# Patient Record
Sex: MALE | URBAN - METROPOLITAN AREA
[De-identification: names, ages, dates, MRNs, and addresses within clinical notes are randomized per-mention and may not be internally consistent; named-entity substitution may affect disease eponyms.]

---

## 2017-12-07 PROBLEM — Z00.00 ENCOUNTER FOR PREVENTIVE HEALTH EXAMINATION: Status: ACTIVE | Noted: 2017-12-07

## 2024-08-14 ENCOUNTER — EMERGENCY (EMERGENCY)
Facility: HOSPITAL | Age: 84
LOS: 0 days | Discharge: HOME | End: 2024-08-14
Attending: EMERGENCY MEDICINE
Payer: MEDICARE

## 2024-08-14 VITALS
TEMPERATURE: 97 F | OXYGEN SATURATION: 97 % | WEIGHT: 160.06 LBS | RESPIRATION RATE: 19 BRPM | HEART RATE: 63 BPM | SYSTOLIC BLOOD PRESSURE: 135 MMHG | DIASTOLIC BLOOD PRESSURE: 77 MMHG | HEIGHT: 68 IN

## 2024-08-14 VITALS — TEMPERATURE: 97 F | RESPIRATION RATE: 18 BRPM | OXYGEN SATURATION: 99 %

## 2024-08-14 DIAGNOSIS — R00.0 TACHYCARDIA, UNSPECIFIED: ICD-10-CM

## 2024-08-14 DIAGNOSIS — E83.51 HYPOCALCEMIA: ICD-10-CM

## 2024-08-14 DIAGNOSIS — F32.A DEPRESSION, UNSPECIFIED: ICD-10-CM

## 2024-08-14 DIAGNOSIS — E86.0 DEHYDRATION: ICD-10-CM

## 2024-08-14 DIAGNOSIS — R63.4 ABNORMAL WEIGHT LOSS: ICD-10-CM

## 2024-08-14 LAB
ALBUMIN SERPL ELPH-MCNC: 3 G/DL — LOW (ref 3.3–5)
ALP SERPL-CCNC: 68 U/L — SIGNIFICANT CHANGE UP (ref 40–120)
ALT FLD-CCNC: 42 U/L — SIGNIFICANT CHANGE UP (ref 12–78)
ANION GAP SERPL CALC-SCNC: 4 MMOL/L — LOW (ref 5–17)
ANISOCYTOSIS BLD QL: SLIGHT — SIGNIFICANT CHANGE UP
APTT BLD: 24 SEC — LOW (ref 24.5–35.6)
AST SERPL-CCNC: 50 U/L — HIGH (ref 15–37)
BASOPHILS # BLD AUTO: 0 K/UL — SIGNIFICANT CHANGE UP (ref 0–0.2)
BASOPHILS NFR BLD AUTO: 0 % — SIGNIFICANT CHANGE UP (ref 0–2)
BILIRUB SERPL-MCNC: 0.5 MG/DL — SIGNIFICANT CHANGE UP (ref 0.2–1.2)
BUN SERPL-MCNC: 25 MG/DL — HIGH (ref 7–23)
BURR CELLS BLD QL SMEAR: PRESENT — SIGNIFICANT CHANGE UP
CALCIUM SERPL-MCNC: 8 MG/DL — LOW (ref 8.5–10.1)
CHLORIDE SERPL-SCNC: 108 MMOL/L — SIGNIFICANT CHANGE UP (ref 96–108)
CO2 SERPL-SCNC: 26 MMOL/L — SIGNIFICANT CHANGE UP (ref 22–31)
CREAT SERPL-MCNC: 1.18 MG/DL — SIGNIFICANT CHANGE UP (ref 0.5–1.3)
EGFR: 61 ML/MIN/1.73M2 — SIGNIFICANT CHANGE UP
ELLIPTOCYTES BLD QL SMEAR: SLIGHT — SIGNIFICANT CHANGE UP
EOSINOPHIL # BLD AUTO: 0 K/UL — SIGNIFICANT CHANGE UP (ref 0–0.5)
EOSINOPHIL NFR BLD AUTO: 0 % — SIGNIFICANT CHANGE UP (ref 0–6)
GLUCOSE SERPL-MCNC: 100 MG/DL — HIGH (ref 70–99)
HCT VFR BLD CALC: 45.1 % — SIGNIFICANT CHANGE UP (ref 39–50)
HGB BLD-MCNC: 14.4 G/DL — SIGNIFICANT CHANGE UP (ref 13–17)
INR BLD: 0.94 RATIO — SIGNIFICANT CHANGE UP (ref 0.85–1.18)
LYMPHOCYTES # BLD AUTO: 2.89 K/UL — SIGNIFICANT CHANGE UP (ref 1–3.3)
LYMPHOCYTES # BLD AUTO: 63 % — HIGH (ref 13–44)
MAGNESIUM SERPL-MCNC: 2.2 MG/DL — SIGNIFICANT CHANGE UP (ref 1.6–2.6)
MANUAL SMEAR VERIFICATION: SIGNIFICANT CHANGE UP
MCHC RBC-ENTMCNC: 23.6 PG — LOW (ref 27–34)
MCHC RBC-ENTMCNC: 31.9 G/DL — LOW (ref 32–36)
MCV RBC AUTO: 73.8 FL — LOW (ref 80–100)
MONOCYTES # BLD AUTO: 0.6 K/UL — SIGNIFICANT CHANGE UP (ref 0–0.9)
MONOCYTES NFR BLD AUTO: 13 % — SIGNIFICANT CHANGE UP (ref 2–14)
NEUTROPHILS # BLD AUTO: 0.69 K/UL — LOW (ref 1.8–7.4)
NEUTROPHILS NFR BLD AUTO: 15 % — LOW (ref 43–77)
NRBC # BLD: 0 /100 WBCS — SIGNIFICANT CHANGE UP (ref 0–0)
NRBC # BLD: SIGNIFICANT CHANGE UP /100 WBCS (ref 0–0)
OVALOCYTES BLD QL SMEAR: SLIGHT — SIGNIFICANT CHANGE UP
PHOSPHATE SERPL-MCNC: 2.7 MG/DL — SIGNIFICANT CHANGE UP (ref 2.5–4.5)
PLAT MORPH BLD: NORMAL — SIGNIFICANT CHANGE UP
PLATELET # BLD AUTO: 116 K/UL — LOW (ref 150–400)
POTASSIUM SERPL-MCNC: 5.2 MMOL/L — SIGNIFICANT CHANGE UP (ref 3.5–5.3)
POTASSIUM SERPL-SCNC: 5.2 MMOL/L — SIGNIFICANT CHANGE UP (ref 3.5–5.3)
PROT SERPL-MCNC: 7 GM/DL — SIGNIFICANT CHANGE UP (ref 6–8.3)
PROTHROM AB SERPL-ACNC: 11.2 SEC — SIGNIFICANT CHANGE UP (ref 9.5–13)
RBC # BLD: 6.11 M/UL — HIGH (ref 4.2–5.8)
RBC # FLD: 15.9 % — HIGH (ref 10.3–14.5)
RBC BLD AUTO: ABNORMAL
SODIUM SERPL-SCNC: 138 MMOL/L — SIGNIFICANT CHANGE UP (ref 135–145)
TROPONIN I, HIGH SENSITIVITY RESULT: 51.9 NG/L — SIGNIFICANT CHANGE UP
VARIANT LYMPHS # BLD: 9 % — HIGH (ref 0–6)
WBC # BLD: 4.59 K/UL — SIGNIFICANT CHANGE UP (ref 3.8–10.5)
WBC # FLD AUTO: 4.59 K/UL — SIGNIFICANT CHANGE UP (ref 3.8–10.5)

## 2024-08-14 PROCEDURE — 99285 EMERGENCY DEPT VISIT HI MDM: CPT

## 2024-08-14 PROCEDURE — 71045 X-RAY EXAM CHEST 1 VIEW: CPT | Mod: 26

## 2024-08-14 PROCEDURE — 93010 ELECTROCARDIOGRAM REPORT: CPT

## 2024-08-14 RX ORDER — BACTERIOSTATIC SODIUM CHLORIDE 0.9 %
1000 VIAL (ML) INJECTION ONCE
Refills: 0 | Status: COMPLETED | OUTPATIENT
Start: 2024-08-14 | End: 2024-08-14

## 2024-08-14 RX ADMIN — Medication 1000 MILLILITER(S): at 12:14

## 2024-08-14 RX ADMIN — Medication 1000 MILLILITER(S): at 11:14

## 2024-08-14 RX ADMIN — Medication 500 MILLILITER(S): at 11:14

## 2024-08-14 NOTE — ED ADULT NURSE NOTE - NSFALLRISKINTERV_ED_ALL_ED

## 2024-08-14 NOTE — ED PROVIDER NOTE - CLINICAL SUMMARY MEDICAL DECISION MAKING FREE TEXT BOX
Discussed with patient as well as son regarding lab results.  Patient otherwise without major signs of dehydration noted mild  hypocalcemia noted otherwise noted orthostatics negative and patient with improvement after IV fluids.  Patient was offered admission however states she would like to go home possible for further hydration and intake.  Likely mild dehydration otherwise patient agreeable to eating and drinking when he gets home.  Discussed with son that they may return should there be a change again or worsening. Xray chest without acute findings

## 2024-08-14 NOTE — ED ADULT NURSE NOTE - NSFALLRISKFACTORS_ED_ALL_ED
Patient called and stated a PA is required for Modafinil 200 mg.    Patient is currently at [facility]- [phone], to contact her.    Patient wanted to know if this will be done today.   No indicators present

## 2024-08-14 NOTE — ED PROVIDER NOTE - NSFOLLOWUPINSTRUCTIONS_ED_ALL_ED_FT
Dehydration, Adult  Dehydration is a condition in which there is not enough water or other fluids in the body. This happens when a person loses more fluids than they take in. Important organs, such as the kidneys, brain, and heart, cannot function without a proper amount of fluids. Any loss of fluids from the body can lead to dehydration.    Dehydration can be mild, moderate, or severe. It should be treated right away to prevent it from becoming severe.    What are the causes?  Dehydration may be caused by:  Health conditions, such as diarrhea, vomiting, fever, infection, or sweating or urinating a lot.  Not drinking enough fluids.  Certain medicines, such as medicines that remove excess fluid from the body (diuretics).  Lack of safe drinking water.  Not being able to get enough water and food.  What increases the risk?  The following factors may make you more likely to develop this condition:  Having a long-term (chronic) illness that has not been treated properly, such as diabetes, heart disease, or kidney disease.  Being 65 years of age or older.  Having a disability.  Living in a place that is high in altitude, where thinner, drier air causes more fluid loss.  Doing exercises that put stress on your body for a long time (endurance sports).  Being active in a hot climate.  What are the signs or symptoms?  Symptoms of dehydration depend on how severe it is.    Mild or moderate dehydration    Thirst.  Dry lips or dry mouth.  Dizziness or light-headedness.  Muscle cramps.  Dark urine. Urine may be the color of tea.  Less urine or tears produced than usual.  Headache.  Severe dehydration    Changes in skin. Your skin may be cold and clammy, blotchy, or pale. Your skin also may not return to normal after being lightly pinched and released.  Little or no tears, urine, or sweat.  Rapid breathing and low blood pressure. Your pulse may be weak or may be faster than 100 beats per minute when you are sitting still.  Other changes, such as:  Feeling very thirsty.  Sunken eyes.  Cold hands and feet.  Confusion.  Being very tired (lethargic) or having trouble waking from sleep.  Short-term weight loss.  Loss of consciousness.  How is this diagnosed?  This condition is diagnosed based on your symptoms and a physical exam. You may have blood and urine tests to help confirm the diagnosis.    How is this treated?  A person's hand, showing an inserted IV catheter.   Treatment for this condition depends on how severe it is. Treatment should be started right away. Do not wait until dehydration becomes severe. Severe dehydration is an emergency and needs to be treated in a hospital.  Mild or moderate dehydration can be treated at home. You may be asked to:  Drink more fluids.  Drink an oral rehydration solution (ORS). This drink restores fluids, salts, and minerals in the blood (electrolytes).  Stop any activities that caused dehydration, such as exercise.  Cool off with cool compresses, cool mist, or cool fluids, if heat or too much sweat caused your condition.  Take medicine to treat fever, if fever caused your condition.  Take medicine to treat nausea and diarrhea, if vomiting or diarrhea caused your condition.  Severe dehydration can be treated:  With IV fluids.  By correcting abnormal levels of electrolytes in your body.  By treating the underlying cause of dehydration.  Follow these instructions at home:  Oral rehydration solution    A glass of water with a spoonful of ORS ready to be added to it.  If told by your health care provider, drink an ORS:  Make an ORS by following instructions on the package.  Start by drinking small amounts, about ½ cup (120 mL) every 5–10 minutes.  Slowly increase how much you drink until you have taken the amount recommended by your health care provider.  Eating and drinking    A person drinking water from a glass.   Drink enough clear fluid to keep your urine pale yellow. If you were told to drink an ORS, finish the ORS first and then start slowly drinking other clear fluids. Drink fluids such as:  Water. Do not drink only water. Doing that can lead to hyponatremia, which is having too little salt (sodium) in the body.  Water from ice chips you suck on.  Diluted fruit juice. This is fruit juice that you have added water to.  Low-calorie sports drinks.  Eat foods that contain a healthy balance of electrolytes, such as bananas, oranges, potatoes, tomatoes, and spinach.  Do not drink alcohol.  Avoid the following:  Drinks that contain a lot of sugar. These include high-calorie sports drinks, fruit juice that is not diluted, and soda.  Caffeine.  Foods that are greasy or contain a lot of fat or sugar.  General instructions    Take over-the-counter and prescription medicines only as told by your health care provider.  Do not take sodium tablets. Doing that can lead to having too much sodium in the body (hypernatremia).  Return to your normal activities as told by your health care provider. Ask your health care provider what activities are safe for you.  Keep all follow-up visits. Your health care provider may need to check your progress and suggest new ways to treat your condition.  Contact a health care provider if:  You have muscle cramps, pain, or discomfort, such as:  Pain in your abdomen and the pain gets worse or stays in one area.  Stiff neck.  You have a rash.  You are more irritable than usual.  You are sleepier or have a harder time waking.  You feel weak or dizzy.  You feel very thirsty.  Get help right away if:  You have symptoms of severe dehydration.  You vomit every time you eat or drink.  Your vomiting gets worse, does not go away, or includes blood or green matter (bile).  You are getting treatment but symptoms are getting worse.  You have a fever.  You have a severe headache.  You have:  Diarrhea that gets worse or does not go away.  Blood in your stool. This may cause stool to look black and tarry.  Not urinating, or urinating only a small amount of very dark urine, within 6–8 hours.  You have trouble breathing.  These symptoms may be an emergency. Get help right away.  Do not wait to see if the symptoms will go away.  Do not drive yourself to the hospital. Call 911.  This information is not intended to replace advice given to you by your health care provider. Make sure you discuss any questions you have with your health care provider.

## 2024-08-14 NOTE — ED PROVIDER NOTE - OBJECTIVE STATEMENT
83 year old with no pmh presenting to ED due to decreased PO intake for past 3 days - otherwise son states he has not wanted to eat and has not had much to drink either. No pain or discomfort - noted by EMS to have tachycardia, presumed

## 2024-08-14 NOTE — ED ADULT NURSE NOTE - OBJECTIVE STATEMENT
83 year old male a/ox3 c/o weakness and decreased appetite x 3 days, pt denies any c/o pain at this time, denies sob, dizziness, lightheadedness, pt reports at baseline ambulates independently, pt denies falls/trauma, pt states "when I stand, I can't stand for long" pt also denies n/v, reports no past medical history

## 2024-08-14 NOTE — ED PROVIDER NOTE - PATIENT PORTAL LINK FT
You can access the FollowMyHealth Patient Portal offered by [facility] by registering at the following website: [email or web address]. By joining [employer]’s FollowMyHealth portal, you will also be able to view your health information using other applications (apps) compatible with our system.

## 2024-08-14 NOTE — ED ADULT TRIAGE NOTE - CHIEF COMPLAINT QUOTE
Patient BIBA presents to ED c/o weakness and poor appetite x 3 days. Patient also suffered 2 deaths within the family within the last month. Denies any CP or SOB.  no pmh

## 2024-08-14 NOTE — ED PROVIDER NOTE - CARDIAC RHYTHM
Flynn Morales Phone #  [phone]  [address] [place] [address]  Phone: [phone] ext. [phone]  Fax: [phone]  Monday - Thursday 9am-5pm  Friday 9am-4pm   regular

## 2025-01-01 ENCOUNTER — INPATIENT (INPATIENT)
Facility: HOSPITAL | Age: 85
LOS: 3 days | End: 2025-02-27
Attending: INTERNAL MEDICINE | Admitting: INTERNAL MEDICINE
Payer: MEDICARE

## 2025-01-01 ENCOUNTER — RESULT REVIEW (OUTPATIENT)
Age: 85
End: 2025-01-01

## 2025-01-01 VITALS — HEART RATE: 88 BPM | SYSTOLIC BLOOD PRESSURE: 83 MMHG | DIASTOLIC BLOOD PRESSURE: 56 MMHG

## 2025-01-01 DIAGNOSIS — I46.9 CARDIAC ARREST, CAUSE UNSPECIFIED: ICD-10-CM

## 2025-01-01 LAB
-  CLINDAMYCIN: SIGNIFICANT CHANGE UP
-  ERYTHROMYCIN: SIGNIFICANT CHANGE UP
-  GENTAMICIN: SIGNIFICANT CHANGE UP
-  OXACILLIN: SIGNIFICANT CHANGE UP
-  RIFAMPIN: SIGNIFICANT CHANGE UP
-  TETRACYCLINE: SIGNIFICANT CHANGE UP
-  TRIMETHOPRIM/SULFAMETHOXAZOLE: SIGNIFICANT CHANGE UP
-  VANCOMYCIN: SIGNIFICANT CHANGE UP
A1C WITH ESTIMATED AVERAGE GLUCOSE RESULT: 6.1 % — HIGH (ref 4–5.6)
ADD ON TEST-SPECIMEN IN LAB: SIGNIFICANT CHANGE UP
ALBUMIN SERPL ELPH-MCNC: 2.5 G/DL — LOW (ref 3.3–5)
ALBUMIN SERPL ELPH-MCNC: 2.6 G/DL — LOW (ref 3.3–5)
ALBUMIN SERPL ELPH-MCNC: 2.7 G/DL — LOW (ref 3.3–5)
ALBUMIN SERPL ELPH-MCNC: 2.8 G/DL — LOW (ref 3.3–5)
ALBUMIN SERPL ELPH-MCNC: 2.9 G/DL — LOW (ref 3.3–5)
ALBUMIN SERPL ELPH-MCNC: 3 G/DL — LOW (ref 3.3–5)
ALBUMIN SERPL ELPH-MCNC: 3 G/DL — LOW (ref 3.3–5)
ALBUMIN SERPL ELPH-MCNC: 3.2 G/DL — LOW (ref 3.3–5)
ALBUMIN SERPL ELPH-MCNC: 3.3 G/DL — SIGNIFICANT CHANGE UP (ref 3.3–5)
ALBUMIN SERPL ELPH-MCNC: 3.4 G/DL — SIGNIFICANT CHANGE UP (ref 3.3–5)
ALBUMIN SERPL ELPH-MCNC: 3.5 G/DL — SIGNIFICANT CHANGE UP (ref 3.3–5)
ALBUMIN SERPL ELPH-MCNC: 3.6 G/DL — SIGNIFICANT CHANGE UP (ref 3.3–5)
ALBUMIN SERPL ELPH-MCNC: 3.7 G/DL — SIGNIFICANT CHANGE UP (ref 3.3–5)
ALP SERPL-CCNC: 102 U/L — SIGNIFICANT CHANGE UP (ref 40–120)
ALP SERPL-CCNC: 104 U/L — SIGNIFICANT CHANGE UP (ref 40–120)
ALP SERPL-CCNC: 105 U/L — SIGNIFICANT CHANGE UP (ref 40–120)
ALP SERPL-CCNC: 110 U/L — SIGNIFICANT CHANGE UP (ref 40–120)
ALP SERPL-CCNC: 114 U/L — SIGNIFICANT CHANGE UP (ref 40–120)
ALP SERPL-CCNC: 117 U/L — SIGNIFICANT CHANGE UP (ref 40–120)
ALP SERPL-CCNC: 128 U/L — HIGH (ref 40–120)
ALP SERPL-CCNC: 129 U/L — HIGH (ref 40–120)
ALP SERPL-CCNC: 85 U/L — SIGNIFICANT CHANGE UP (ref 40–120)
ALP SERPL-CCNC: 87 U/L — SIGNIFICANT CHANGE UP (ref 40–120)
ALP SERPL-CCNC: 87 U/L — SIGNIFICANT CHANGE UP (ref 40–120)
ALP SERPL-CCNC: 91 U/L — SIGNIFICANT CHANGE UP (ref 40–120)
ALP SERPL-CCNC: 91 U/L — SIGNIFICANT CHANGE UP (ref 40–120)
ALP SERPL-CCNC: 94 U/L — SIGNIFICANT CHANGE UP (ref 40–120)
ALP SERPL-CCNC: 95 U/L — SIGNIFICANT CHANGE UP (ref 40–120)
ALT FLD-CCNC: 1174 U/L — HIGH (ref 4–41)
ALT FLD-CCNC: 1980 U/L — HIGH (ref 4–41)
ALT FLD-CCNC: 2005 U/L — HIGH (ref 4–41)
ALT FLD-CCNC: 2119 U/L — HIGH (ref 4–41)
ALT FLD-CCNC: 2164 U/L — HIGH (ref 4–41)
ALT FLD-CCNC: 3027 U/L — HIGH (ref 4–41)
ALT FLD-CCNC: 3447 U/L — HIGH (ref 4–41)
ALT FLD-CCNC: 42 U/L — HIGH (ref 4–41)
ALT FLD-CCNC: 453 U/L — HIGH (ref 4–41)
ALT FLD-CCNC: 54 U/L — HIGH (ref 4–41)
ALT FLD-CCNC: 59 U/L — HIGH (ref 4–41)
ALT FLD-CCNC: 61 U/L — HIGH (ref 4–41)
ALT FLD-CCNC: 63 U/L — HIGH (ref 4–41)
ALT FLD-CCNC: 68 U/L — HIGH (ref 4–41)
ALT FLD-CCNC: 69 U/L — HIGH (ref 4–41)
ANION GAP SERPL CALC-SCNC: 14 MMOL/L — SIGNIFICANT CHANGE UP (ref 7–14)
ANION GAP SERPL CALC-SCNC: 15 MMOL/L — HIGH (ref 7–14)
ANION GAP SERPL CALC-SCNC: 16 MMOL/L — HIGH (ref 7–14)
ANION GAP SERPL CALC-SCNC: 16 MMOL/L — HIGH (ref 7–14)
ANION GAP SERPL CALC-SCNC: 17 MMOL/L — HIGH (ref 7–14)
ANION GAP SERPL CALC-SCNC: 18 MMOL/L — HIGH (ref 7–14)
ANION GAP SERPL CALC-SCNC: 19 MMOL/L — HIGH (ref 7–14)
ANION GAP SERPL CALC-SCNC: 19 MMOL/L — HIGH (ref 7–14)
ANION GAP SERPL CALC-SCNC: 20 MMOL/L — HIGH (ref 7–14)
ANION GAP SERPL CALC-SCNC: 22 MMOL/L — HIGH (ref 7–14)
APPEARANCE UR: ABNORMAL
APTT BLD: 26.7 SEC — SIGNIFICANT CHANGE UP (ref 24.5–35.6)
APTT BLD: 32.4 SEC — SIGNIFICANT CHANGE UP (ref 24.5–35.6)
APTT BLD: 55.1 SEC — HIGH (ref 24.5–35.6)
APTT BLD: 56.1 SEC — HIGH (ref 24.5–35.6)
APTT BLD: 70.7 SEC — HIGH (ref 24.5–35.6)
APTT BLD: 75.1 SEC — HIGH (ref 24.5–35.6)
APTT BLD: 81.2 SEC — HIGH (ref 24.5–35.6)
APTT BLD: 86.2 SEC — HIGH (ref 24.5–35.6)
APTT BLD: 86.3 SEC — HIGH (ref 24.5–35.6)
APTT BLD: 89.2 SEC — HIGH (ref 24.5–35.6)
AST SERPL-CCNC: 1037 U/L — HIGH (ref 4–40)
AST SERPL-CCNC: 144 U/L — HIGH (ref 4–40)
AST SERPL-CCNC: 1526 U/L — HIGH (ref 4–40)
AST SERPL-CCNC: 155 U/L — HIGH (ref 4–40)
AST SERPL-CCNC: 1828 U/L — HIGH (ref 4–40)
AST SERPL-CCNC: 195 U/L — HIGH (ref 4–40)
AST SERPL-CCNC: 200 U/L — HIGH (ref 4–40)
AST SERPL-CCNC: 2007 U/L — HIGH (ref 4–40)
AST SERPL-CCNC: 2206 U/L — HIGH (ref 4–40)
AST SERPL-CCNC: 230 U/L — HIGH (ref 4–40)
AST SERPL-CCNC: 2435 U/L — HIGH (ref 4–40)
AST SERPL-CCNC: 2832 U/L — HIGH (ref 4–40)
AST SERPL-CCNC: 470 U/L — HIGH (ref 4–40)
AST SERPL-CCNC: 66 U/L — HIGH (ref 4–40)
AST SERPL-CCNC: 98 U/L — HIGH (ref 4–40)
BACTERIA # UR AUTO: ABNORMAL /HPF
BACTERIA # UR AUTO: ABNORMAL /HPF
BACTERIA # UR AUTO: NEGATIVE /HPF — SIGNIFICANT CHANGE UP
BASE EXCESS BLDA CALC-SCNC: -7.3 MMOL/L — LOW (ref -2–3)
BASOPHILS # BLD AUTO: 0 K/UL — SIGNIFICANT CHANGE UP (ref 0–0.2)
BASOPHILS # BLD AUTO: 0.01 K/UL — SIGNIFICANT CHANGE UP (ref 0–0.2)
BASOPHILS # BLD AUTO: 0.04 K/UL — SIGNIFICANT CHANGE UP (ref 0–0.2)
BASOPHILS NFR BLD AUTO: 0 % — SIGNIFICANT CHANGE UP (ref 0–2)
BASOPHILS NFR BLD AUTO: 0.1 % — SIGNIFICANT CHANGE UP (ref 0–2)
BASOPHILS NFR BLD AUTO: 0.3 % — SIGNIFICANT CHANGE UP (ref 0–2)
BILIRUB SERPL-MCNC: 0.4 MG/DL — SIGNIFICANT CHANGE UP (ref 0.2–1.2)
BILIRUB SERPL-MCNC: 0.4 MG/DL — SIGNIFICANT CHANGE UP (ref 0.2–1.2)
BILIRUB SERPL-MCNC: 0.6 MG/DL — SIGNIFICANT CHANGE UP (ref 0.2–1.2)
BILIRUB SERPL-MCNC: 0.6 MG/DL — SIGNIFICANT CHANGE UP (ref 0.2–1.2)
BILIRUB SERPL-MCNC: 0.7 MG/DL — SIGNIFICANT CHANGE UP (ref 0.2–1.2)
BILIRUB SERPL-MCNC: 0.8 MG/DL — SIGNIFICANT CHANGE UP (ref 0.2–1.2)
BILIRUB SERPL-MCNC: 1.3 MG/DL — HIGH (ref 0.2–1.2)
BILIRUB SERPL-MCNC: 1.4 MG/DL — HIGH (ref 0.2–1.2)
BILIRUB SERPL-MCNC: 1.5 MG/DL — HIGH (ref 0.2–1.2)
BILIRUB SERPL-MCNC: 1.6 MG/DL — HIGH (ref 0.2–1.2)
BILIRUB SERPL-MCNC: 1.7 MG/DL — HIGH (ref 0.2–1.2)
BILIRUB SERPL-MCNC: 1.7 MG/DL — HIGH (ref 0.2–1.2)
BILIRUB SERPL-MCNC: 1.8 MG/DL — HIGH (ref 0.2–1.2)
BILIRUB SERPL-MCNC: 2.2 MG/DL — HIGH (ref 0.2–1.2)
BILIRUB SERPL-MCNC: 2.8 MG/DL — HIGH (ref 0.2–1.2)
BILIRUB UR-MCNC: ABNORMAL
BILIRUB UR-MCNC: NEGATIVE — SIGNIFICANT CHANGE UP
BILIRUB UR-MCNC: NEGATIVE — SIGNIFICANT CHANGE UP
BLOOD GAS ARTERIAL - LYTES,HGB,ICA,LACT RESULT: SIGNIFICANT CHANGE UP
BLOOD GAS ARTERIAL - LYTES,HGB,ICA,LACT RESULT: SIGNIFICANT CHANGE UP
BLOOD GAS ARTERIAL COMPREHENSIVE RESULT: SIGNIFICANT CHANGE UP
BLOOD GAS VENOUS COMPREHENSIVE RESULT: SIGNIFICANT CHANGE UP
BUN SERPL-MCNC: 19 MG/DL — SIGNIFICANT CHANGE UP (ref 7–23)
BUN SERPL-MCNC: 23 MG/DL — SIGNIFICANT CHANGE UP (ref 7–23)
BUN SERPL-MCNC: 26 MG/DL — HIGH (ref 7–23)
BUN SERPL-MCNC: 27 MG/DL — HIGH (ref 7–23)
BUN SERPL-MCNC: 32 MG/DL — HIGH (ref 7–23)
BUN SERPL-MCNC: 34 MG/DL — HIGH (ref 7–23)
BUN SERPL-MCNC: 36 MG/DL — HIGH (ref 7–23)
BUN SERPL-MCNC: 38 MG/DL — HIGH (ref 7–23)
BUN SERPL-MCNC: 42 MG/DL — HIGH (ref 7–23)
BUN SERPL-MCNC: 44 MG/DL — HIGH (ref 7–23)
BUN SERPL-MCNC: 45 MG/DL — HIGH (ref 7–23)
BUN SERPL-MCNC: 47 MG/DL — HIGH (ref 7–23)
BUN SERPL-MCNC: 49 MG/DL — HIGH (ref 7–23)
CA-I BLDA-SCNC: 1.08 MMOL/L — LOW (ref 1.15–1.33)
CALCIUM SERPL-MCNC: 6.6 MG/DL — LOW (ref 8.4–10.5)
CALCIUM SERPL-MCNC: 6.7 MG/DL — LOW (ref 8.4–10.5)
CALCIUM SERPL-MCNC: 7.2 MG/DL — LOW (ref 8.4–10.5)
CALCIUM SERPL-MCNC: 7.3 MG/DL — LOW (ref 8.4–10.5)
CALCIUM SERPL-MCNC: 7.3 MG/DL — LOW (ref 8.4–10.5)
CALCIUM SERPL-MCNC: 7.4 MG/DL — LOW (ref 8.4–10.5)
CALCIUM SERPL-MCNC: 7.5 MG/DL — LOW (ref 8.4–10.5)
CALCIUM SERPL-MCNC: 7.6 MG/DL — LOW (ref 8.4–10.5)
CALCIUM SERPL-MCNC: 7.9 MG/DL — LOW (ref 8.4–10.5)
CALCIUM SERPL-MCNC: 7.9 MG/DL — LOW (ref 8.4–10.5)
CALCIUM SERPL-MCNC: 8 MG/DL — LOW (ref 8.4–10.5)
CALCIUM SERPL-MCNC: 8 MG/DL — LOW (ref 8.4–10.5)
CALCIUM SERPL-MCNC: 8.1 MG/DL — LOW (ref 8.4–10.5)
CALCIUM SERPL-MCNC: 8.2 MG/DL — LOW (ref 8.4–10.5)
CALCIUM SERPL-MCNC: 8.5 MG/DL — SIGNIFICANT CHANGE UP (ref 8.4–10.5)
CAST: 1 /LPF — SIGNIFICANT CHANGE UP (ref 0–4)
CAST: 5 /LPF — HIGH (ref 0–4)
CAST: 7 /LPF — HIGH (ref 0–4)
CHLORIDE SERPL-SCNC: 102 MMOL/L — SIGNIFICANT CHANGE UP (ref 98–107)
CHLORIDE SERPL-SCNC: 104 MMOL/L — SIGNIFICANT CHANGE UP (ref 98–107)
CHLORIDE SERPL-SCNC: 105 MMOL/L — SIGNIFICANT CHANGE UP (ref 98–107)
CHLORIDE SERPL-SCNC: 105 MMOL/L — SIGNIFICANT CHANGE UP (ref 98–107)
CHLORIDE SERPL-SCNC: 106 MMOL/L — SIGNIFICANT CHANGE UP (ref 98–107)
CHLORIDE SERPL-SCNC: 106 MMOL/L — SIGNIFICANT CHANGE UP (ref 98–107)
CHLORIDE SERPL-SCNC: 107 MMOL/L — SIGNIFICANT CHANGE UP (ref 98–107)
CHLORIDE SERPL-SCNC: 107 MMOL/L — SIGNIFICANT CHANGE UP (ref 98–107)
CHLORIDE SERPL-SCNC: 109 MMOL/L — HIGH (ref 98–107)
CHLORIDE SERPL-SCNC: 110 MMOL/L — HIGH (ref 98–107)
CHLORIDE SERPL-SCNC: 110 MMOL/L — HIGH (ref 98–107)
CHOLEST SERPL-MCNC: 152 MG/DL — SIGNIFICANT CHANGE UP
CK MB BLD-MCNC: 1.7 % — SIGNIFICANT CHANGE UP (ref 0–2.5)
CK MB BLD-MCNC: 6.1 % — HIGH (ref 0–2.5)
CK MB BLD-MCNC: 8.1 % — HIGH (ref 0–2.5)
CK MB CFR SERPL CALC: 161.8 NG/ML — HIGH
CK MB CFR SERPL CALC: 167.5 NG/ML — HIGH
CK MB CFR SERPL CALC: 36.6 NG/ML — HIGH
CK MB CFR SERPL CALC: 7.5 NG/ML — HIGH
CK MB CFR SERPL CALC: 76.4 NG/ML — HIGH
CK MB CFR SERPL CALC: 8.3 NG/ML — HIGH
CK MB CFR SERPL CALC: 93.4 NG/ML — HIGH
CK SERPL-CCNC: 1154 U/L — HIGH (ref 30–200)
CK SERPL-CCNC: 2724 U/L — HIGH (ref 30–200)
CK SERPL-CCNC: 4394 U/L — HIGH (ref 30–200)
CO2 BLDA-SCNC: 19 MMOL/L — SIGNIFICANT CHANGE UP (ref 19–24)
CO2 SERPL-SCNC: 11 MMOL/L — LOW (ref 22–31)
CO2 SERPL-SCNC: 12 MMOL/L — LOW (ref 22–31)
CO2 SERPL-SCNC: 13 MMOL/L — LOW (ref 22–31)
CO2 SERPL-SCNC: 13 MMOL/L — LOW (ref 22–31)
CO2 SERPL-SCNC: 14 MMOL/L — LOW (ref 22–31)
CO2 SERPL-SCNC: 14 MMOL/L — LOW (ref 22–31)
CO2 SERPL-SCNC: 15 MMOL/L — LOW (ref 22–31)
CO2 SERPL-SCNC: 16 MMOL/L — LOW (ref 22–31)
CO2 SERPL-SCNC: 17 MMOL/L — LOW (ref 22–31)
CO2 SERPL-SCNC: 17 MMOL/L — LOW (ref 22–31)
CO2 SERPL-SCNC: 18 MMOL/L — LOW (ref 22–31)
CO2 SERPL-SCNC: 18 MMOL/L — LOW (ref 22–31)
CO2 SERPL-SCNC: 19 MMOL/L — LOW (ref 22–31)
COARSE GRAN CASTS #/AREA URNS AUTO: PRESENT
COLOR SPEC: SIGNIFICANT CHANGE UP
COLOR SPEC: SIGNIFICANT CHANGE UP
COLOR SPEC: YELLOW — SIGNIFICANT CHANGE UP
CREAT SERPL-MCNC: 1.24 MG/DL — SIGNIFICANT CHANGE UP (ref 0.5–1.3)
CREAT SERPL-MCNC: 1.24 MG/DL — SIGNIFICANT CHANGE UP (ref 0.5–1.3)
CREAT SERPL-MCNC: 1.26 MG/DL — SIGNIFICANT CHANGE UP (ref 0.5–1.3)
CREAT SERPL-MCNC: 1.32 MG/DL — HIGH (ref 0.5–1.3)
CREAT SERPL-MCNC: 1.35 MG/DL — HIGH (ref 0.5–1.3)
CREAT SERPL-MCNC: 1.37 MG/DL — HIGH (ref 0.5–1.3)
CREAT SERPL-MCNC: 2.16 MG/DL — HIGH (ref 0.5–1.3)
CREAT SERPL-MCNC: 2.47 MG/DL — HIGH (ref 0.5–1.3)
CREAT SERPL-MCNC: 2.85 MG/DL — HIGH (ref 0.5–1.3)
CREAT SERPL-MCNC: 3.14 MG/DL — HIGH (ref 0.5–1.3)
CREAT SERPL-MCNC: 3.87 MG/DL — HIGH (ref 0.5–1.3)
CREAT SERPL-MCNC: 4.49 MG/DL — HIGH (ref 0.5–1.3)
CREAT SERPL-MCNC: 4.75 MG/DL — HIGH (ref 0.5–1.3)
CREAT SERPL-MCNC: 5.43 MG/DL — HIGH (ref 0.5–1.3)
CREAT SERPL-MCNC: 5.68 MG/DL — HIGH (ref 0.5–1.3)
CULTURE RESULTS: ABNORMAL
DIFF PNL FLD: ABNORMAL
EGFR: 10 ML/MIN/1.73M2 — LOW
EGFR: 11 ML/MIN/1.73M2 — LOW
EGFR: 12 ML/MIN/1.73M2 — LOW
EGFR: 15 ML/MIN/1.73M2 — LOW
EGFR: 19 ML/MIN/1.73M2 — LOW
EGFR: 21 ML/MIN/1.73M2 — LOW
EGFR: 25 ML/MIN/1.73M2 — LOW
EGFR: 29 ML/MIN/1.73M2 — LOW
EGFR: 51 ML/MIN/1.73M2 — LOW
EGFR: 51 ML/MIN/1.73M2 — LOW
EGFR: 53 ML/MIN/1.73M2 — LOW
EGFR: 56 ML/MIN/1.73M2 — LOW
EGFR: 57 ML/MIN/1.73M2 — LOW
EGFR: 57 ML/MIN/1.73M2 — LOW
EGFR: 9 ML/MIN/1.73M2 — LOW
EOSINOPHIL # BLD AUTO: 0 K/UL — SIGNIFICANT CHANGE UP (ref 0–0.5)
EOSINOPHIL # BLD AUTO: 0.11 K/UL — SIGNIFICANT CHANGE UP (ref 0–0.5)
EOSINOPHIL NFR BLD AUTO: 0 % — SIGNIFICANT CHANGE UP (ref 0–6)
EOSINOPHIL NFR BLD AUTO: 0.9 % — SIGNIFICANT CHANGE UP (ref 0–6)
ESTIMATED AVERAGE GLUCOSE: 128 — SIGNIFICANT CHANGE UP
FLUAV AG NPH QL: SIGNIFICANT CHANGE UP
FLUAV AG NPH QL: SIGNIFICANT CHANGE UP
FLUBV AG NPH QL: SIGNIFICANT CHANGE UP
FLUBV AG NPH QL: SIGNIFICANT CHANGE UP
GIANT PLATELETS BLD QL SMEAR: PRESENT — SIGNIFICANT CHANGE UP
GLUCOSE BLDA-MCNC: 197 MG/DL — HIGH (ref 70–99)
GLUCOSE BLDC GLUCOMTR-MCNC: 127 MG/DL — HIGH (ref 70–99)
GLUCOSE BLDC GLUCOMTR-MCNC: 128 MG/DL — HIGH (ref 70–99)
GLUCOSE BLDC GLUCOMTR-MCNC: 133 MG/DL — HIGH (ref 70–99)
GLUCOSE BLDC GLUCOMTR-MCNC: 133 MG/DL — HIGH (ref 70–99)
GLUCOSE BLDC GLUCOMTR-MCNC: 142 MG/DL — HIGH (ref 70–99)
GLUCOSE BLDC GLUCOMTR-MCNC: 143 MG/DL — HIGH (ref 70–99)
GLUCOSE BLDC GLUCOMTR-MCNC: 144 MG/DL — HIGH (ref 70–99)
GLUCOSE BLDC GLUCOMTR-MCNC: 147 MG/DL — HIGH (ref 70–99)
GLUCOSE BLDC GLUCOMTR-MCNC: 164 MG/DL — HIGH (ref 70–99)
GLUCOSE BLDC GLUCOMTR-MCNC: 167 MG/DL — HIGH (ref 70–99)
GLUCOSE BLDC GLUCOMTR-MCNC: 168 MG/DL — HIGH (ref 70–99)
GLUCOSE BLDC GLUCOMTR-MCNC: 186 MG/DL — HIGH (ref 70–99)
GLUCOSE BLDC GLUCOMTR-MCNC: 226 MG/DL — HIGH (ref 70–99)
GLUCOSE BLDC GLUCOMTR-MCNC: 232 MG/DL — HIGH (ref 70–99)
GLUCOSE BLDC GLUCOMTR-MCNC: 241 MG/DL — HIGH (ref 70–99)
GLUCOSE BLDC GLUCOMTR-MCNC: 243 MG/DL — HIGH (ref 70–99)
GLUCOSE BLDC GLUCOMTR-MCNC: 244 MG/DL — HIGH (ref 70–99)
GLUCOSE BLDC GLUCOMTR-MCNC: 249 MG/DL — HIGH (ref 70–99)
GLUCOSE BLDC GLUCOMTR-MCNC: 36 MG/DL — CRITICAL LOW (ref 70–99)
GLUCOSE BLDC GLUCOMTR-MCNC: 37 MG/DL — CRITICAL LOW (ref 70–99)
GLUCOSE SERPL-MCNC: 155 MG/DL — HIGH (ref 70–99)
GLUCOSE SERPL-MCNC: 156 MG/DL — HIGH (ref 70–99)
GLUCOSE SERPL-MCNC: 158 MG/DL — HIGH (ref 70–99)
GLUCOSE SERPL-MCNC: 162 MG/DL — HIGH (ref 70–99)
GLUCOSE SERPL-MCNC: 165 MG/DL — HIGH (ref 70–99)
GLUCOSE SERPL-MCNC: 169 MG/DL — HIGH (ref 70–99)
GLUCOSE SERPL-MCNC: 197 MG/DL — HIGH (ref 70–99)
GLUCOSE SERPL-MCNC: 226 MG/DL — HIGH (ref 70–99)
GLUCOSE SERPL-MCNC: 263 MG/DL — HIGH (ref 70–99)
GLUCOSE SERPL-MCNC: 269 MG/DL — HIGH (ref 70–99)
GLUCOSE SERPL-MCNC: 271 MG/DL — HIGH (ref 70–99)
GLUCOSE SERPL-MCNC: 272 MG/DL — HIGH (ref 70–99)
GLUCOSE SERPL-MCNC: 278 MG/DL — HIGH (ref 70–99)
GLUCOSE SERPL-MCNC: 289 MG/DL — HIGH (ref 70–99)
GLUCOSE SERPL-MCNC: 296 MG/DL — HIGH (ref 70–99)
GLUCOSE UR QL: 100 MG/DL
GLUCOSE UR QL: NEGATIVE MG/DL — SIGNIFICANT CHANGE UP
GLUCOSE UR QL: NEGATIVE MG/DL — SIGNIFICANT CHANGE UP
GRAM STN FLD: ABNORMAL
HCO3 BLDA-SCNC: 18 MMOL/L — LOW (ref 21–28)
HCT VFR BLD CALC: 33.8 % — LOW (ref 39–50)
HCT VFR BLD CALC: 34 % — LOW (ref 39–50)
HCT VFR BLD CALC: 34.7 % — LOW (ref 39–50)
HCT VFR BLD CALC: 36 % — LOW (ref 39–50)
HCT VFR BLD CALC: 37.4 % — LOW (ref 39–50)
HCT VFR BLD CALC: 37.9 % — LOW (ref 39–50)
HCT VFR BLD CALC: 38 % — LOW (ref 39–50)
HCT VFR BLD CALC: 38.5 % — LOW (ref 39–50)
HCT VFR BLD CALC: 41.6 % — SIGNIFICANT CHANGE UP (ref 39–50)
HCT VFR BLD CALC: 42.8 % — SIGNIFICANT CHANGE UP (ref 39–50)
HCT VFR BLDA CALC: 39 % — SIGNIFICANT CHANGE UP
HCT VFR BLDA CALC: 40 % — SIGNIFICANT CHANGE UP
HDLC SERPL-MCNC: 56 MG/DL — SIGNIFICANT CHANGE UP
HGB BLD CALC-MCNC: 12.9 G/DL — SIGNIFICANT CHANGE UP (ref 12.6–17.4)
HGB BLD-MCNC: 10.6 G/DL — LOW (ref 13–17)
HGB BLD-MCNC: 11 G/DL — LOW (ref 13–17)
HGB BLD-MCNC: 11.1 G/DL — LOW (ref 13–17)
HGB BLD-MCNC: 11.5 G/DL — LOW (ref 13–17)
HGB BLD-MCNC: 12.2 G/DL — LOW (ref 13–17)
HGB BLD-MCNC: 12.3 G/DL — LOW (ref 13–17)
HGB BLD-MCNC: 12.5 G/DL — LOW (ref 13–17)
HGB BLD-MCNC: 12.5 G/DL — LOW (ref 13–17)
HGB BLD-MCNC: 12.6 G/DL — LOW (ref 13–17)
HGB BLD-MCNC: 13 G/DL — SIGNIFICANT CHANGE UP (ref 13–17)
HGB BLDA-MCNC: 13.4 G/DL — SIGNIFICANT CHANGE UP (ref 12.6–17.4)
IANC: 12.94 K/UL — HIGH (ref 1.8–7.4)
IANC: 14.92 K/UL — HIGH (ref 1.8–7.4)
IANC: 6.06 K/UL — SIGNIFICANT CHANGE UP (ref 1.8–7.4)
IANC: 8.25 K/UL — HIGH (ref 1.8–7.4)
IANC: 8.69 K/UL — HIGH (ref 1.8–7.4)
IMM GRANULOCYTES NFR BLD AUTO: 0.5 % — SIGNIFICANT CHANGE UP (ref 0–0.9)
IMM GRANULOCYTES NFR BLD AUTO: 0.5 % — SIGNIFICANT CHANGE UP (ref 0–0.9)
INR BLD: 1.03 RATIO — SIGNIFICANT CHANGE UP (ref 0.85–1.16)
INR BLD: 1.05 RATIO — SIGNIFICANT CHANGE UP (ref 0.85–1.16)
INR BLD: 1.11 RATIO — SIGNIFICANT CHANGE UP (ref 0.85–1.16)
INR BLD: 1.19 RATIO — HIGH (ref 0.85–1.16)
KETONES UR-MCNC: ABNORMAL MG/DL
KETONES UR-MCNC: NEGATIVE MG/DL — SIGNIFICANT CHANGE UP
KETONES UR-MCNC: NEGATIVE MG/DL — SIGNIFICANT CHANGE UP
LACTATE BLDA-MCNC: 3.1 MMOL/L — HIGH (ref 0.5–2)
LEUKOCYTE ESTERASE UR-ACNC: ABNORMAL
LEUKOCYTE ESTERASE UR-ACNC: ABNORMAL
LEUKOCYTE ESTERASE UR-ACNC: NEGATIVE — SIGNIFICANT CHANGE UP
LIPID PNL WITH DIRECT LDL SERPL: 84 MG/DL — SIGNIFICANT CHANGE UP
LYMPHOCYTES # BLD AUTO: 1.09 K/UL — SIGNIFICANT CHANGE UP (ref 1–3.3)
LYMPHOCYTES # BLD AUTO: 1.28 K/UL — SIGNIFICANT CHANGE UP (ref 1–3.3)
LYMPHOCYTES # BLD AUTO: 1.65 K/UL — SIGNIFICANT CHANGE UP (ref 1–3.3)
LYMPHOCYTES # BLD AUTO: 13.9 % — SIGNIFICANT CHANGE UP (ref 13–44)
LYMPHOCYTES # BLD AUTO: 17.9 % — SIGNIFICANT CHANGE UP (ref 13–44)
LYMPHOCYTES # BLD AUTO: 2.16 K/UL — SIGNIFICANT CHANGE UP (ref 1–3.3)
LYMPHOCYTES # BLD AUTO: 3.74 K/UL — HIGH (ref 1–3.3)
LYMPHOCYTES # BLD AUTO: 30.1 % — SIGNIFICANT CHANGE UP (ref 13–44)
LYMPHOCYTES # BLD AUTO: 7 % — LOW (ref 13–44)
LYMPHOCYTES # BLD AUTO: 7 % — LOW (ref 13–44)
MAGNESIUM SERPL-MCNC: 2.2 MG/DL — SIGNIFICANT CHANGE UP (ref 1.6–2.6)
MAGNESIUM SERPL-MCNC: 2.3 MG/DL — SIGNIFICANT CHANGE UP (ref 1.6–2.6)
MAGNESIUM SERPL-MCNC: 2.4 MG/DL — SIGNIFICANT CHANGE UP (ref 1.6–2.6)
MAGNESIUM SERPL-MCNC: 2.5 MG/DL — SIGNIFICANT CHANGE UP (ref 1.6–2.6)
MAGNESIUM SERPL-MCNC: 2.6 MG/DL — SIGNIFICANT CHANGE UP (ref 1.6–2.6)
MAGNESIUM SERPL-MCNC: 2.7 MG/DL — HIGH (ref 1.6–2.6)
MAGNESIUM SERPL-MCNC: 3 MG/DL — HIGH (ref 1.6–2.6)
MAGNESIUM SERPL-MCNC: 3.3 MG/DL — HIGH (ref 1.6–2.6)
MCHC RBC-ENTMCNC: 23.1 PG — LOW (ref 27–34)
MCHC RBC-ENTMCNC: 23.4 PG — LOW (ref 27–34)
MCHC RBC-ENTMCNC: 23.6 PG — LOW (ref 27–34)
MCHC RBC-ENTMCNC: 23.8 PG — LOW (ref 27–34)
MCHC RBC-ENTMCNC: 23.9 PG — LOW (ref 27–34)
MCHC RBC-ENTMCNC: 23.9 PG — LOW (ref 27–34)
MCHC RBC-ENTMCNC: 24 PG — LOW (ref 27–34)
MCHC RBC-ENTMCNC: 24.1 PG — LOW (ref 27–34)
MCHC RBC-ENTMCNC: 30 G/DL — LOW (ref 32–36)
MCHC RBC-ENTMCNC: 30.4 G/DL — LOW (ref 32–36)
MCHC RBC-ENTMCNC: 31.4 G/DL — LOW (ref 32–36)
MCHC RBC-ENTMCNC: 31.9 G/DL — LOW (ref 32–36)
MCHC RBC-ENTMCNC: 32 G/DL — SIGNIFICANT CHANGE UP (ref 32–36)
MCHC RBC-ENTMCNC: 32.1 G/DL — SIGNIFICANT CHANGE UP (ref 32–36)
MCHC RBC-ENTMCNC: 32.4 G/DL — SIGNIFICANT CHANGE UP (ref 32–36)
MCHC RBC-ENTMCNC: 32.7 G/DL — SIGNIFICANT CHANGE UP (ref 32–36)
MCHC RBC-ENTMCNC: 32.9 G/DL — SIGNIFICANT CHANGE UP (ref 32–36)
MCHC RBC-ENTMCNC: 33 G/DL — SIGNIFICANT CHANGE UP (ref 32–36)
MCV RBC AUTO: 72.2 FL — LOW (ref 80–100)
MCV RBC AUTO: 72.9 FL — LOW (ref 80–100)
MCV RBC AUTO: 73.1 FL — LOW (ref 80–100)
MCV RBC AUTO: 73.6 FL — LOW (ref 80–100)
MCV RBC AUTO: 73.9 FL — LOW (ref 80–100)
MCV RBC AUTO: 74.4 FL — LOW (ref 80–100)
MCV RBC AUTO: 74.8 FL — LOW (ref 80–100)
MCV RBC AUTO: 75.1 FL — LOW (ref 80–100)
MCV RBC AUTO: 76.2 FL — LOW (ref 80–100)
MCV RBC AUTO: 77.8 FL — LOW (ref 80–100)
METAMYELOCYTES # FLD: 1.8 % — HIGH (ref 0–1)
METAMYELOCYTES NFR BLD: 1.8 % — HIGH (ref 0–1)
METHOD TYPE: SIGNIFICANT CHANGE UP
MONOCYTES # BLD AUTO: 0.77 K/UL — SIGNIFICANT CHANGE UP (ref 0–0.9)
MONOCYTES # BLD AUTO: 0.8 K/UL — SIGNIFICANT CHANGE UP (ref 0–0.9)
MONOCYTES # BLD AUTO: 1.07 K/UL — HIGH (ref 0–0.9)
MONOCYTES # BLD AUTO: 1.43 K/UL — HIGH (ref 0–0.9)
MONOCYTES # BLD AUTO: 1.47 K/UL — HIGH (ref 0–0.9)
MONOCYTES NFR BLD AUTO: 12.3 % — SIGNIFICANT CHANGE UP (ref 2–14)
MONOCYTES NFR BLD AUTO: 4.4 % — SIGNIFICANT CHANGE UP (ref 2–14)
MONOCYTES NFR BLD AUTO: 6.2 % — SIGNIFICANT CHANGE UP (ref 2–14)
MONOCYTES NFR BLD AUTO: 8.9 % — SIGNIFICANT CHANGE UP (ref 2–14)
MONOCYTES NFR BLD AUTO: 9.2 % — SIGNIFICANT CHANGE UP (ref 2–14)
MRSA PCR RESULT.: SIGNIFICANT CHANGE UP
NEUTROPHILS # BLD AUTO: 12.94 K/UL — HIGH (ref 1.8–7.4)
NEUTROPHILS # BLD AUTO: 16.2 K/UL — HIGH (ref 1.8–7.4)
NEUTROPHILS # BLD AUTO: 6.92 K/UL — SIGNIFICANT CHANGE UP (ref 1.8–7.4)
NEUTROPHILS # BLD AUTO: 7.97 K/UL — HIGH (ref 1.8–7.4)
NEUTROPHILS # BLD AUTO: 8.69 K/UL — HIGH (ref 1.8–7.4)
NEUTROPHILS NFR BLD AUTO: 54.8 % — SIGNIFICANT CHANGE UP (ref 43–77)
NEUTROPHILS NFR BLD AUTO: 66.1 % — SIGNIFICANT CHANGE UP (ref 43–77)
NEUTROPHILS NFR BLD AUTO: 73 % — SIGNIFICANT CHANGE UP (ref 43–77)
NEUTROPHILS NFR BLD AUTO: 83.2 % — HIGH (ref 43–77)
NEUTROPHILS NFR BLD AUTO: 83.3 % — HIGH (ref 43–77)
NEUTS BAND # BLD: 0.9 % — SIGNIFICANT CHANGE UP (ref 0–6)
NEUTS BAND NFR BLD: 0.9 % — SIGNIFICANT CHANGE UP (ref 0–6)
NITRITE UR-MCNC: NEGATIVE — SIGNIFICANT CHANGE UP
NON HDL CHOLESTEROL: 96 MG/DL — SIGNIFICANT CHANGE UP
NRBC # BLD AUTO: 0 K/UL — SIGNIFICANT CHANGE UP (ref 0–0)
NRBC # FLD: 0 K/UL — SIGNIFICANT CHANGE UP (ref 0–0)
NRBC BLD AUTO-RTO: 0 /100 WBCS — SIGNIFICANT CHANGE UP (ref 0–0)
NT-PROBNP SERPL-SCNC: 165 PG/ML — SIGNIFICANT CHANGE UP
ORGANISM # SPEC MICROSCOPIC CNT: ABNORMAL
ORGANISM # SPEC MICROSCOPIC CNT: ABNORMAL
PCO2 BLDA: 35 MMHG — SIGNIFICANT CHANGE UP (ref 35–48)
PH BLDA: 7.32 — LOW (ref 7.35–7.45)
PH UR: 5 — SIGNIFICANT CHANGE UP (ref 5–8)
PH UR: 5.5 — SIGNIFICANT CHANGE UP (ref 5–8)
PH UR: 6 — SIGNIFICANT CHANGE UP (ref 5–8)
PHOSPHATE SERPL-MCNC: 2.5 MG/DL — SIGNIFICANT CHANGE UP (ref 2.5–4.5)
PHOSPHATE SERPL-MCNC: 2.5 MG/DL — SIGNIFICANT CHANGE UP (ref 2.5–4.5)
PHOSPHATE SERPL-MCNC: 2.8 MG/DL — SIGNIFICANT CHANGE UP (ref 2.5–4.5)
PHOSPHATE SERPL-MCNC: 3.1 MG/DL — SIGNIFICANT CHANGE UP (ref 2.5–4.5)
PHOSPHATE SERPL-MCNC: 3.3 MG/DL — SIGNIFICANT CHANGE UP (ref 2.5–4.5)
PHOSPHATE SERPL-MCNC: 4 MG/DL — SIGNIFICANT CHANGE UP (ref 2.5–4.5)
PHOSPHATE SERPL-MCNC: 4.1 MG/DL — SIGNIFICANT CHANGE UP (ref 2.5–4.5)
PHOSPHATE SERPL-MCNC: 4.1 MG/DL — SIGNIFICANT CHANGE UP (ref 2.5–4.5)
PHOSPHATE SERPL-MCNC: 4.7 MG/DL — HIGH (ref 2.5–4.5)
PHOSPHATE SERPL-MCNC: 5 MG/DL — HIGH (ref 2.5–4.5)
PHOSPHATE SERPL-MCNC: 5.7 MG/DL — HIGH (ref 2.5–4.5)
PHOSPHATE SERPL-MCNC: 5.8 MG/DL — HIGH (ref 2.5–4.5)
PHOSPHATE SERPL-MCNC: 5.9 MG/DL — HIGH (ref 2.5–4.5)
PHOSPHATE SERPL-MCNC: 6.6 MG/DL — HIGH (ref 2.5–4.5)
PLAT MORPH BLD: NORMAL — SIGNIFICANT CHANGE UP
PLATELET # BLD AUTO: 211 K/UL — SIGNIFICANT CHANGE UP (ref 150–400)
PLATELET # BLD AUTO: 226 K/UL — SIGNIFICANT CHANGE UP (ref 150–400)
PLATELET # BLD AUTO: 226 K/UL — SIGNIFICANT CHANGE UP (ref 150–400)
PLATELET # BLD AUTO: 227 K/UL — SIGNIFICANT CHANGE UP (ref 150–400)
PLATELET # BLD AUTO: 256 K/UL — SIGNIFICANT CHANGE UP (ref 150–400)
PLATELET # BLD AUTO: 272 K/UL — SIGNIFICANT CHANGE UP (ref 150–400)
PLATELET # BLD AUTO: 318 K/UL — SIGNIFICANT CHANGE UP (ref 150–400)
PLATELET # BLD AUTO: 320 K/UL — SIGNIFICANT CHANGE UP (ref 150–400)
PLATELET # BLD AUTO: 323 K/UL — SIGNIFICANT CHANGE UP (ref 150–400)
PLATELET # BLD AUTO: 353 K/UL — SIGNIFICANT CHANGE UP (ref 150–400)
PLATELET COUNT - ESTIMATE: NORMAL — SIGNIFICANT CHANGE UP
PO2 BLDA: 133 MMHG — HIGH (ref 83–108)
POTASSIUM BLDA-SCNC: 3.9 MMOL/L — SIGNIFICANT CHANGE UP (ref 3.5–5.1)
POTASSIUM SERPL-MCNC: 4.1 MMOL/L — SIGNIFICANT CHANGE UP (ref 3.5–5.3)
POTASSIUM SERPL-MCNC: 4.2 MMOL/L — SIGNIFICANT CHANGE UP (ref 3.5–5.3)
POTASSIUM SERPL-MCNC: 4.5 MMOL/L — SIGNIFICANT CHANGE UP (ref 3.5–5.3)
POTASSIUM SERPL-MCNC: 4.6 MMOL/L — SIGNIFICANT CHANGE UP (ref 3.5–5.3)
POTASSIUM SERPL-MCNC: 4.7 MMOL/L — SIGNIFICANT CHANGE UP (ref 3.5–5.3)
POTASSIUM SERPL-MCNC: 4.8 MMOL/L — SIGNIFICANT CHANGE UP (ref 3.5–5.3)
POTASSIUM SERPL-MCNC: 4.8 MMOL/L — SIGNIFICANT CHANGE UP (ref 3.5–5.3)
POTASSIUM SERPL-MCNC: 5 MMOL/L — SIGNIFICANT CHANGE UP (ref 3.5–5.3)
POTASSIUM SERPL-MCNC: 5.2 MMOL/L — SIGNIFICANT CHANGE UP (ref 3.5–5.3)
POTASSIUM SERPL-MCNC: 5.2 MMOL/L — SIGNIFICANT CHANGE UP (ref 3.5–5.3)
POTASSIUM SERPL-MCNC: 5.3 MMOL/L — SIGNIFICANT CHANGE UP (ref 3.5–5.3)
POTASSIUM SERPL-MCNC: 5.7 MMOL/L — HIGH (ref 3.5–5.3)
POTASSIUM SERPL-MCNC: 5.7 MMOL/L — HIGH (ref 3.5–5.3)
POTASSIUM SERPL-SCNC: 4.1 MMOL/L — SIGNIFICANT CHANGE UP (ref 3.5–5.3)
POTASSIUM SERPL-SCNC: 4.2 MMOL/L — SIGNIFICANT CHANGE UP (ref 3.5–5.3)
POTASSIUM SERPL-SCNC: 4.5 MMOL/L — SIGNIFICANT CHANGE UP (ref 3.5–5.3)
POTASSIUM SERPL-SCNC: 4.6 MMOL/L — SIGNIFICANT CHANGE UP (ref 3.5–5.3)
POTASSIUM SERPL-SCNC: 4.7 MMOL/L — SIGNIFICANT CHANGE UP (ref 3.5–5.3)
POTASSIUM SERPL-SCNC: 4.8 MMOL/L — SIGNIFICANT CHANGE UP (ref 3.5–5.3)
POTASSIUM SERPL-SCNC: 4.8 MMOL/L — SIGNIFICANT CHANGE UP (ref 3.5–5.3)
POTASSIUM SERPL-SCNC: 5 MMOL/L — SIGNIFICANT CHANGE UP (ref 3.5–5.3)
POTASSIUM SERPL-SCNC: 5.2 MMOL/L — SIGNIFICANT CHANGE UP (ref 3.5–5.3)
POTASSIUM SERPL-SCNC: 5.2 MMOL/L — SIGNIFICANT CHANGE UP (ref 3.5–5.3)
POTASSIUM SERPL-SCNC: 5.3 MMOL/L — SIGNIFICANT CHANGE UP (ref 3.5–5.3)
POTASSIUM SERPL-SCNC: 5.7 MMOL/L — HIGH (ref 3.5–5.3)
POTASSIUM SERPL-SCNC: 5.7 MMOL/L — HIGH (ref 3.5–5.3)
PROCALCITONIN SERPL-MCNC: 1.71 NG/ML — HIGH (ref 0.02–0.1)
PROT SERPL-MCNC: 5.3 G/DL — LOW (ref 6–8.3)
PROT SERPL-MCNC: 5.4 G/DL — LOW (ref 6–8.3)
PROT SERPL-MCNC: 5.7 G/DL — LOW (ref 6–8.3)
PROT SERPL-MCNC: 5.8 G/DL — LOW (ref 6–8.3)
PROT SERPL-MCNC: 5.9 G/DL — LOW (ref 6–8.3)
PROT SERPL-MCNC: 6 G/DL — SIGNIFICANT CHANGE UP (ref 6–8.3)
PROT SERPL-MCNC: 6.1 G/DL — SIGNIFICANT CHANGE UP (ref 6–8.3)
PROT SERPL-MCNC: 6.2 G/DL — SIGNIFICANT CHANGE UP (ref 6–8.3)
PROT SERPL-MCNC: 6.2 G/DL — SIGNIFICANT CHANGE UP (ref 6–8.3)
PROT SERPL-MCNC: 6.4 G/DL — SIGNIFICANT CHANGE UP (ref 6–8.3)
PROT SERPL-MCNC: 6.6 G/DL — SIGNIFICANT CHANGE UP (ref 6–8.3)
PROT SERPL-MCNC: 6.6 G/DL — SIGNIFICANT CHANGE UP (ref 6–8.3)
PROT SERPL-MCNC: 6.8 G/DL — SIGNIFICANT CHANGE UP (ref 6–8.3)
PROT UR-MCNC: 100 MG/DL
PROT UR-MCNC: 30 MG/DL
PROT UR-MCNC: 300 MG/DL
PROTHROM AB SERPL-ACNC: 11.9 SEC — SIGNIFICANT CHANGE UP (ref 9.9–13.4)
PROTHROM AB SERPL-ACNC: 12.5 SEC — SIGNIFICANT CHANGE UP (ref 9.9–13.4)
PROTHROM AB SERPL-ACNC: 13.2 SEC — SIGNIFICANT CHANGE UP (ref 9.9–13.4)
PROTHROM AB SERPL-ACNC: 13.8 SEC — HIGH (ref 9.9–13.4)
RBC # BLD: 4.5 M/UL — SIGNIFICANT CHANGE UP (ref 4.2–5.8)
RBC # BLD: 4.57 M/UL — SIGNIFICANT CHANGE UP (ref 4.2–5.8)
RBC # BLD: 4.64 M/UL — SIGNIFICANT CHANGE UP (ref 4.2–5.8)
RBC # BLD: 4.87 M/UL — SIGNIFICANT CHANGE UP (ref 4.2–5.8)
RBC # BLD: 5.13 M/UL — SIGNIFICANT CHANGE UP (ref 4.2–5.8)
RBC # BLD: 5.16 M/UL — SIGNIFICANT CHANGE UP (ref 4.2–5.8)
RBC # BLD: 5.25 M/UL — SIGNIFICANT CHANGE UP (ref 4.2–5.8)
RBC # BLD: 5.27 M/UL — SIGNIFICANT CHANGE UP (ref 4.2–5.8)
RBC # BLD: 5.35 M/UL — SIGNIFICANT CHANGE UP (ref 4.2–5.8)
RBC # BLD: 5.62 M/UL — SIGNIFICANT CHANGE UP (ref 4.2–5.8)
RBC # FLD: 14.7 % — HIGH (ref 10.3–14.5)
RBC # FLD: 14.8 % — HIGH (ref 10.3–14.5)
RBC # FLD: 14.8 % — HIGH (ref 10.3–14.5)
RBC # FLD: 14.9 % — HIGH (ref 10.3–14.5)
RBC # FLD: 15 % — HIGH (ref 10.3–14.5)
RBC # FLD: 15.1 % — HIGH (ref 10.3–14.5)
RBC # FLD: 15.1 % — HIGH (ref 10.3–14.5)
RBC # FLD: 15.4 % — HIGH (ref 10.3–14.5)
RBC # FLD: 15.7 % — HIGH (ref 10.3–14.5)
RBC # FLD: 15.9 % — HIGH (ref 10.3–14.5)
RBC BLD AUTO: NORMAL — SIGNIFICANT CHANGE UP
RBC CASTS # UR COMP ASSIST: 215 /HPF — HIGH (ref 0–4)
RBC CASTS # UR COMP ASSIST: 85 /HPF — HIGH (ref 0–4)
RBC CASTS # UR COMP ASSIST: >50 /HPF — SIGNIFICANT CHANGE UP (ref 0–4)
REVIEW: SIGNIFICANT CHANGE UP
REVIEW: SIGNIFICANT CHANGE UP
RSV RNA NPH QL NAA+NON-PROBE: SIGNIFICANT CHANGE UP
RSV RNA NPH QL NAA+NON-PROBE: SIGNIFICANT CHANGE UP
S AUREUS DNA NOSE QL NAA+PROBE: SIGNIFICANT CHANGE UP
SAO2 % BLDA: 99.3 % — HIGH (ref 94–98)
SAO2 % BLDV: 75 % — SIGNIFICANT CHANGE UP (ref 67–88)
SARS-COV-2 RNA SPEC QL NAA+PROBE: SIGNIFICANT CHANGE UP
SARS-COV-2 RNA SPEC QL NAA+PROBE: SIGNIFICANT CHANGE UP
SMUDGE CELLS # BLD: PRESENT — SIGNIFICANT CHANGE UP
SODIUM BLDA-SCNC: 138 MMOL/L — SIGNIFICANT CHANGE UP (ref 136–145)
SODIUM SERPL-SCNC: 135 MMOL/L — SIGNIFICANT CHANGE UP (ref 135–145)
SODIUM SERPL-SCNC: 135 MMOL/L — SIGNIFICANT CHANGE UP (ref 135–145)
SODIUM SERPL-SCNC: 136 MMOL/L — SIGNIFICANT CHANGE UP (ref 135–145)
SODIUM SERPL-SCNC: 137 MMOL/L — SIGNIFICANT CHANGE UP (ref 135–145)
SODIUM SERPL-SCNC: 140 MMOL/L — SIGNIFICANT CHANGE UP (ref 135–145)
SODIUM SERPL-SCNC: 141 MMOL/L — SIGNIFICANT CHANGE UP (ref 135–145)
SODIUM SERPL-SCNC: 142 MMOL/L — SIGNIFICANT CHANGE UP (ref 135–145)
SODIUM SERPL-SCNC: 143 MMOL/L — SIGNIFICANT CHANGE UP (ref 135–145)
SP GR SPEC: 1.02 — SIGNIFICANT CHANGE UP (ref 1–1.03)
SP GR SPEC: 1.02 — SIGNIFICANT CHANGE UP (ref 1–1.03)
SP GR SPEC: 1.04 — HIGH (ref 1–1.03)
SPECIMEN SOURCE: SIGNIFICANT CHANGE UP
SPECIMEN SOURCE: SIGNIFICANT CHANGE UP
SQUAMOUS # UR AUTO: 0 /HPF — SIGNIFICANT CHANGE UP (ref 0–5)
SQUAMOUS # UR AUTO: 2 /HPF — SIGNIFICANT CHANGE UP (ref 0–5)
SQUAMOUS # UR AUTO: 7 /HPF — HIGH (ref 0–5)
TRIGL SERPL-MCNC: 59 MG/DL — SIGNIFICANT CHANGE UP
TROPONIN T, HIGH SENSITIVITY RESULT: 1262 NG/L — CRITICAL HIGH
TROPONIN T, HIGH SENSITIVITY RESULT: 160 NG/L — CRITICAL HIGH
TROPONIN T, HIGH SENSITIVITY RESULT: 2292 NG/L — CRITICAL HIGH
TROPONIN T, HIGH SENSITIVITY RESULT: 2684 NG/L — CRITICAL HIGH
TROPONIN T, HIGH SENSITIVITY RESULT: 3760 NG/L — CRITICAL HIGH
TROPONIN T, HIGH SENSITIVITY RESULT: 3814 NG/L — CRITICAL HIGH
TROPONIN T, HIGH SENSITIVITY RESULT: 8989 NG/L — CRITICAL HIGH
TROPONIN T, HIGH SENSITIVITY RESULT: CRITICAL HIGH NG/L
TSH SERPL-MCNC: 1.5 UIU/ML — SIGNIFICANT CHANGE UP (ref 0.27–4.2)
UROBILINOGEN FLD QL: 0.2 MG/DL — SIGNIFICANT CHANGE UP (ref 0.2–1)
UROBILINOGEN FLD QL: 1 MG/DL — SIGNIFICANT CHANGE UP (ref 0.2–1)
UROBILINOGEN FLD QL: 1 MG/DL — SIGNIFICANT CHANGE UP (ref 0.2–1)
VANCOMYCIN FLD-MCNC: 20.1 UG/ML — SIGNIFICANT CHANGE UP
VARIANT LYMPHS # BLD: 5.3 % — SIGNIFICANT CHANGE UP (ref 0–6)
VARIANT LYMPHS NFR BLD MANUAL: 5.3 % — SIGNIFICANT CHANGE UP (ref 0–6)
WBC # BLD: 11.91 K/UL — HIGH (ref 3.8–10.5)
WBC # BLD: 12.06 K/UL — HIGH (ref 3.8–10.5)
WBC # BLD: 12.42 K/UL — HIGH (ref 3.8–10.5)
WBC # BLD: 15.54 K/UL — HIGH (ref 3.8–10.5)
WBC # BLD: 15.79 K/UL — HIGH (ref 3.8–10.5)
WBC # BLD: 16.42 K/UL — HIGH (ref 3.8–10.5)
WBC # BLD: 16.86 K/UL — HIGH (ref 3.8–10.5)
WBC # BLD: 17.26 K/UL — HIGH (ref 3.8–10.5)
WBC # BLD: 17.57 K/UL — HIGH (ref 3.8–10.5)
WBC # BLD: 18.29 K/UL — HIGH (ref 3.8–10.5)
WBC # FLD AUTO: 11.91 K/UL — HIGH (ref 3.8–10.5)
WBC # FLD AUTO: 12.06 K/UL — HIGH (ref 3.8–10.5)
WBC # FLD AUTO: 12.42 K/UL — HIGH (ref 3.8–10.5)
WBC # FLD AUTO: 15.54 K/UL — HIGH (ref 3.8–10.5)
WBC # FLD AUTO: 15.79 K/UL — HIGH (ref 3.8–10.5)
WBC # FLD AUTO: 16.42 K/UL — HIGH (ref 3.8–10.5)
WBC # FLD AUTO: 16.86 K/UL — HIGH (ref 3.8–10.5)
WBC # FLD AUTO: 17.26 K/UL — HIGH (ref 3.8–10.5)
WBC # FLD AUTO: 17.57 K/UL — HIGH (ref 3.8–10.5)
WBC # FLD AUTO: 18.29 K/UL — HIGH (ref 3.8–10.5)
WBC UR QL: 1 /HPF — SIGNIFICANT CHANGE UP (ref 0–5)
WBC UR QL: 1 /HPF — SIGNIFICANT CHANGE UP (ref 0–5)
WBC UR QL: 4 /HPF — SIGNIFICANT CHANGE UP (ref 0–5)

## 2025-01-01 PROCEDURE — 93460 R&L HRT ART/VENTRICLE ANGIO: CPT | Mod: 26

## 2025-01-01 PROCEDURE — 99291 CRITICAL CARE FIRST HOUR: CPT

## 2025-01-01 PROCEDURE — 71045 X-RAY EXAM CHEST 1 VIEW: CPT | Mod: 26

## 2025-01-01 PROCEDURE — 76937 US GUIDE VASCULAR ACCESS: CPT | Mod: 26

## 2025-01-01 PROCEDURE — 71045 X-RAY EXAM CHEST 1 VIEW: CPT | Mod: 26,76

## 2025-01-01 PROCEDURE — 36620 INSERTION CATHETER ARTERY: CPT

## 2025-01-01 PROCEDURE — 95720 EEG PHY/QHP EA INCR W/VEEG: CPT

## 2025-01-01 PROCEDURE — 93308 TTE F-UP OR LMTD: CPT | Mod: 26,GC

## 2025-01-01 PROCEDURE — 93308 TTE F-UP OR LMTD: CPT | Mod: 26

## 2025-01-01 PROCEDURE — 71250 CT THORAX DX C-: CPT | Mod: 26

## 2025-01-01 PROCEDURE — 33967 INSERT I-AORT PERCUT DEVICE: CPT

## 2025-01-01 PROCEDURE — 93306 TTE W/DOPPLER COMPLETE: CPT | Mod: 26

## 2025-01-01 RX ORDER — INSULIN LISPRO 100 U/ML
INJECTION, SOLUTION INTRAVENOUS; SUBCUTANEOUS EVERY 6 HOURS
Refills: 0 | Status: DISCONTINUED | OUTPATIENT
Start: 2025-01-01 | End: 2025-01-01

## 2025-01-01 RX ORDER — PHENYLEPHRINE HCL IN 0.9% NACL 0.5 MG/5ML
2 SYRINGE (ML) INTRAVENOUS
Qty: 160 | Refills: 0 | Status: DISCONTINUED | OUTPATIENT
Start: 2025-01-01 | End: 2025-01-01

## 2025-01-01 RX ORDER — DEXMEDETOMIDINE HYDROCHLORIDE IN SODIUM CHLORIDE 4 UG/ML
0.5 INJECTION INTRAVENOUS
Qty: 400 | Refills: 0 | Status: DISCONTINUED | OUTPATIENT
Start: 2025-01-01 | End: 2025-01-01

## 2025-01-01 RX ORDER — DEXTROSE 50 % IN WATER 50 %
25 SYRINGE (ML) INTRAVENOUS ONCE
Refills: 0 | Status: DISCONTINUED | OUTPATIENT
Start: 2025-01-01 | End: 2025-01-01

## 2025-01-01 RX ORDER — VANCOMYCIN HCL IN 5 % DEXTROSE 1.5G/250ML
1000 PLASTIC BAG, INJECTION (ML) INTRAVENOUS EVERY 12 HOURS
Refills: 0 | Status: DISCONTINUED | OUTPATIENT
Start: 2025-01-01 | End: 2025-01-01

## 2025-01-01 RX ORDER — ATORVASTATIN CALCIUM 80 MG/1
80 TABLET, FILM COATED ORAL AT BEDTIME
Refills: 0 | Status: DISCONTINUED | OUTPATIENT
Start: 2025-01-01 | End: 2025-01-01

## 2025-01-01 RX ORDER — FENTANYL CITRATE-0.9 % NACL/PF 100MCG/2ML
0.5 SYRINGE (ML) INTRAVENOUS
Qty: 2500 | Refills: 0 | Status: DISCONTINUED | OUTPATIENT
Start: 2025-01-01 | End: 2025-01-01

## 2025-01-01 RX ORDER — ACETAMINOPHEN 500 MG/5ML
650 LIQUID (ML) ORAL ONCE
Refills: 0 | Status: COMPLETED | OUTPATIENT
Start: 2025-01-01 | End: 2025-01-01

## 2025-01-01 RX ORDER — PROPOFOL 10 MG/ML
15 INJECTION, EMULSION INTRAVENOUS
Qty: 1000 | Refills: 0 | Status: DISCONTINUED | OUTPATIENT
Start: 2025-01-01 | End: 2025-01-01

## 2025-01-01 RX ORDER — HEPARIN SODIUM 1000 [USP'U]/ML
2500 INJECTION INTRAVENOUS; SUBCUTANEOUS EVERY 6 HOURS
Refills: 0 | Status: DISCONTINUED | OUTPATIENT
Start: 2025-01-01 | End: 2025-01-01

## 2025-01-01 RX ORDER — DEXTROSE 50 % IN WATER 50 %
12.5 SYRINGE (ML) INTRAVENOUS ONCE
Refills: 0 | Status: DISCONTINUED | OUTPATIENT
Start: 2025-01-01 | End: 2025-01-01

## 2025-01-01 RX ORDER — PIPERACILLIN-TAZO-DEXTROSE,ISO 3.375G/5
3.38 IV SOLUTION, PIGGYBACK PREMIX FROZEN(ML) INTRAVENOUS EVERY 12 HOURS
Refills: 0 | Status: DISCONTINUED | OUTPATIENT
Start: 2025-01-01 | End: 2025-01-01

## 2025-01-01 RX ORDER — NOREPINEPHRINE BITARTRATE 8 MG
0.05 SOLUTION INTRAVENOUS
Qty: 8 | Refills: 0 | Status: DISCONTINUED | OUTPATIENT
Start: 2025-01-01 | End: 2025-01-01

## 2025-01-01 RX ORDER — ACETAMINOPHEN 500 MG/5ML
1000 LIQUID (ML) ORAL ONCE
Refills: 0 | Status: COMPLETED | OUTPATIENT
Start: 2025-01-01 | End: 2025-01-01

## 2025-01-01 RX ORDER — MILRINONE LACTATE 1 MG/ML
0.25 INJECTION, SOLUTION INTRAVENOUS
Qty: 20 | Refills: 0 | Status: DISCONTINUED | OUTPATIENT
Start: 2025-01-01 | End: 2025-01-01

## 2025-01-01 RX ORDER — HEPARIN SODIUM 1000 [USP'U]/ML
5000 INJECTION INTRAVENOUS; SUBCUTANEOUS ONCE
Refills: 0 | Status: COMPLETED | OUTPATIENT
Start: 2025-01-01 | End: 2025-01-01

## 2025-01-01 RX ORDER — FUROSEMIDE 10 MG/ML
40 INJECTION INTRAMUSCULAR; INTRAVENOUS ONCE
Refills: 0 | Status: COMPLETED | OUTPATIENT
Start: 2025-01-01 | End: 2025-01-01

## 2025-01-01 RX ORDER — ASPIRIN 325 MG
81 TABLET ORAL DAILY
Refills: 0 | Status: DISCONTINUED | OUTPATIENT
Start: 2025-01-01 | End: 2025-01-01

## 2025-01-01 RX ORDER — VASOPRESSIN 20 [USP'U]/ML
0.04 INJECTION INTRAVENOUS
Qty: 40 | Refills: 0 | Status: DISCONTINUED | OUTPATIENT
Start: 2025-01-01 | End: 2025-01-01

## 2025-01-01 RX ORDER — CLOPIDOGREL BISULFATE 75 MG/1
600 TABLET, FILM COATED ORAL ONCE
Refills: 0 | Status: COMPLETED | OUTPATIENT
Start: 2025-01-01 | End: 2025-01-01

## 2025-01-01 RX ORDER — ASPIRIN 325 MG
324 TABLET ORAL ONCE
Refills: 0 | Status: COMPLETED | OUTPATIENT
Start: 2025-01-01 | End: 2025-01-01

## 2025-01-01 RX ORDER — SODIUM ZIRCONIUM CYCLOSILICATE 5 G/5G
5 POWDER, FOR SUSPENSION ORAL ONCE
Refills: 0 | Status: COMPLETED | OUTPATIENT
Start: 2025-01-01 | End: 2025-01-01

## 2025-01-01 RX ORDER — VASOPRESSIN 20 [USP'U]/ML
0.06 INJECTION INTRAVENOUS
Qty: 40 | Refills: 0 | Status: DISCONTINUED | OUTPATIENT
Start: 2025-01-01 | End: 2025-01-01

## 2025-01-01 RX ORDER — HEPARIN SODIUM 1000 [USP'U]/ML
INJECTION INTRAVENOUS; SUBCUTANEOUS
Qty: 25000 | Refills: 0 | Status: DISCONTINUED | OUTPATIENT
Start: 2025-01-01 | End: 2025-01-01

## 2025-01-01 RX ORDER — MIDAZOLAM IN 0.9 % SOD.CHLORID 1 MG/ML
4 PLASTIC BAG, INJECTION (ML) INTRAVENOUS ONCE
Refills: 0 | Status: DISCONTINUED | OUTPATIENT
Start: 2025-01-01 | End: 2025-01-01

## 2025-01-01 RX ORDER — LEVETIRACETAM 10 MG/ML
500 INJECTION, SOLUTION INTRAVENOUS ONCE
Refills: 0 | Status: COMPLETED | OUTPATIENT
Start: 2025-01-01 | End: 2025-01-01

## 2025-01-01 RX ORDER — ADENOSINE 3 MG/ML
6 INJECTION, SOLUTION INTRAVENOUS ONCE
Refills: 0 | Status: COMPLETED | OUTPATIENT
Start: 2025-01-01 | End: 2025-01-01

## 2025-01-01 RX ORDER — SODIUM CHLORIDE 9 G/1000ML
1000 INJECTION, SOLUTION INTRAVENOUS
Refills: 0 | Status: DISCONTINUED | OUTPATIENT
Start: 2025-01-01 | End: 2025-01-01

## 2025-01-01 RX ORDER — INSULIN LISPRO 100 U/ML
INJECTION, SOLUTION INTRAVENOUS; SUBCUTANEOUS AT BEDTIME
Refills: 0 | Status: DISCONTINUED | OUTPATIENT
Start: 2025-01-01 | End: 2025-01-01

## 2025-01-01 RX ORDER — MILRINONE LACTATE 1 MG/ML
0.12 INJECTION, SOLUTION INTRAVENOUS
Qty: 20 | Refills: 0 | Status: DISCONTINUED | OUTPATIENT
Start: 2025-01-01 | End: 2025-01-01

## 2025-01-01 RX ORDER — NOREPINEPHRINE BITARTRATE 8 MG
0.05 SOLUTION INTRAVENOUS
Qty: 16 | Refills: 0 | Status: DISCONTINUED | OUTPATIENT
Start: 2025-01-01 | End: 2025-01-01

## 2025-01-01 RX ORDER — ISOSORBDIE DINITRATE 30 MG/1
5 TABLET ORAL THREE TIMES A DAY
Refills: 0 | Status: DISCONTINUED | OUTPATIENT
Start: 2025-01-01 | End: 2025-01-01

## 2025-01-01 RX ORDER — SODIUM BICARBONATE 1 MEQ/ML
50 SYRINGE (ML) INTRAVENOUS ONCE
Refills: 0 | Status: COMPLETED | OUTPATIENT
Start: 2025-01-01 | End: 2025-01-01

## 2025-01-01 RX ORDER — CLOPIDOGREL BISULFATE 75 MG/1
75 TABLET, FILM COATED ORAL DAILY
Refills: 0 | Status: DISCONTINUED | OUTPATIENT
Start: 2025-01-01 | End: 2025-01-01

## 2025-01-01 RX ORDER — PIPERACILLIN-TAZO-DEXTROSE,ISO 3.375G/5
3.38 IV SOLUTION, PIGGYBACK PREMIX FROZEN(ML) INTRAVENOUS ONCE
Refills: 0 | Status: COMPLETED | OUTPATIENT
Start: 2025-01-01 | End: 2025-01-01

## 2025-01-01 RX ORDER — HEPARIN SODIUM 1000 [USP'U]/ML
4000 INJECTION INTRAVENOUS; SUBCUTANEOUS EVERY 6 HOURS
Refills: 0 | Status: DISCONTINUED | OUTPATIENT
Start: 2025-01-01 | End: 2025-01-01

## 2025-01-01 RX ORDER — DEXTROSE 50 % IN WATER 50 %
50 SYRINGE (ML) INTRAVENOUS ONCE
Refills: 0 | Status: COMPLETED | OUTPATIENT
Start: 2025-01-01 | End: 2025-01-01

## 2025-01-01 RX ORDER — FENTANYL CITRATE-0.9 % NACL/PF 100MCG/2ML
0.51 SYRINGE (ML) INTRAVENOUS
Qty: 2500 | Refills: 0 | Status: DISCONTINUED | OUTPATIENT
Start: 2025-01-01 | End: 2025-01-01

## 2025-01-01 RX ORDER — ADENOSINE 3 MG/ML
12 INJECTION, SOLUTION INTRAVENOUS ONCE
Refills: 0 | Status: COMPLETED | OUTPATIENT
Start: 2025-01-01 | End: 2025-01-01

## 2025-01-01 RX ORDER — GLUCAGON 3 MG/1
1 POWDER NASAL ONCE
Refills: 0 | Status: DISCONTINUED | OUTPATIENT
Start: 2025-01-01 | End: 2025-01-01

## 2025-01-01 RX ORDER — DEXTROSE 50 % IN WATER 50 %
15 SYRINGE (ML) INTRAVENOUS ONCE
Refills: 0 | Status: DISCONTINUED | OUTPATIENT
Start: 2025-01-01 | End: 2025-01-01

## 2025-01-01 RX ORDER — INFLUENZA A VIRUS A/IDAHO/07/2018 (H1N1) ANTIGEN (MDCK CELL DERIVED, PROPIOLACTONE INACTIVATED, INFLUENZA A VIRUS A/INDIANA/08/2018 (H3N2) ANTIGEN (MDCK CELL DERIVED, PROPIOLACTONE INACTIVATED), INFLUENZA B VIRUS B/SINGAPORE/INFTT-16-0610/2016 ANTIGEN (MDCK CELL DERIVED, PROPIOLACTONE INACTIVATED), INFLUENZA B VIRUS B/IOWA/06/2017 ANTIGEN (MDCK CELL DERIVED, PROPIOLACTONE INACTIVATED) 15; 15; 15; 15 UG/.5ML; UG/.5ML; UG/.5ML; UG/.5ML
0.5 INJECTION, SUSPENSION INTRAMUSCULAR ONCE
Refills: 0 | Status: DISCONTINUED | OUTPATIENT
Start: 2025-01-01 | End: 2025-01-01

## 2025-01-01 RX ORDER — SODIUM BICARBONATE 1 MEQ/ML
25 SYRINGE (ML) INTRAVENOUS ONCE
Refills: 0 | Status: DISCONTINUED | OUTPATIENT
Start: 2025-01-01 | End: 2025-01-01

## 2025-01-01 RX ORDER — VANCOMYCIN HCL IN 5 % DEXTROSE 1.5G/250ML
1000 PLASTIC BAG, INJECTION (ML) INTRAVENOUS ONCE
Refills: 0 | Status: DISCONTINUED | OUTPATIENT
Start: 2025-01-01 | End: 2025-01-01

## 2025-01-01 RX ORDER — LEVETIRACETAM 10 MG/ML
500 INJECTION, SOLUTION INTRAVENOUS ONCE
Refills: 0 | Status: DISCONTINUED | OUTPATIENT
Start: 2025-01-01 | End: 2025-01-01

## 2025-01-01 RX ORDER — DOBUTAMINE 250 MG/20ML
2.5 INJECTION INTRAVENOUS
Qty: 500 | Refills: 0 | Status: DISCONTINUED | OUTPATIENT
Start: 2025-01-01 | End: 2025-01-01

## 2025-01-01 RX ORDER — HEPARIN SODIUM 1000 [USP'U]/ML
1200 INJECTION INTRAVENOUS; SUBCUTANEOUS
Qty: 25000 | Refills: 0 | Status: DISCONTINUED | OUTPATIENT
Start: 2025-01-01 | End: 2025-01-01

## 2025-01-01 RX ORDER — SODIUM ZIRCONIUM CYCLOSILICATE 5 G/5G
10 POWDER, FOR SUSPENSION ORAL ONCE
Refills: 0 | Status: COMPLETED | OUTPATIENT
Start: 2025-01-01 | End: 2025-01-01

## 2025-01-01 RX ORDER — AMIODARONE HYDROCHLORIDE 50 MG/ML
150 INJECTION, SOLUTION INTRAVENOUS ONCE
Refills: 0 | Status: COMPLETED | OUTPATIENT
Start: 2025-01-01 | End: 2025-01-01

## 2025-01-01 RX ORDER — HEPARIN SODIUM 1000 [USP'U]/ML
5500 INJECTION INTRAVENOUS; SUBCUTANEOUS EVERY 6 HOURS
Refills: 0 | Status: DISCONTINUED | OUTPATIENT
Start: 2025-01-01 | End: 2025-01-01

## 2025-01-01 RX ORDER — MAGNESIUM SULFATE 500 MG/ML
2 SYRINGE (ML) INJECTION ONCE
Refills: 0 | Status: COMPLETED | OUTPATIENT
Start: 2025-01-01 | End: 2025-01-01

## 2025-01-01 RX ORDER — LISINOPRIL 5 MG/1
1 TABLET ORAL
Refills: 0 | DISCHARGE

## 2025-01-01 RX ORDER — AMIODARONE HYDROCHLORIDE 50 MG/ML
1 INJECTION, SOLUTION INTRAVENOUS
Qty: 450 | Refills: 0 | Status: DISCONTINUED | OUTPATIENT
Start: 2025-01-01 | End: 2025-01-01

## 2025-01-01 RX ORDER — PIPERACILLIN-TAZO-DEXTROSE,ISO 3.375G/5
3.38 IV SOLUTION, PIGGYBACK PREMIX FROZEN(ML) INTRAVENOUS EVERY 8 HOURS
Refills: 0 | Status: DISCONTINUED | OUTPATIENT
Start: 2025-01-01 | End: 2025-01-01

## 2025-01-01 RX ORDER — AMIODARONE HYDROCHLORIDE 50 MG/ML
0.5 INJECTION, SOLUTION INTRAVENOUS
Qty: 450 | Refills: 0 | Status: DISCONTINUED | OUTPATIENT
Start: 2025-01-01 | End: 2025-01-01

## 2025-01-01 RX ADMIN — DEXMEDETOMIDINE HYDROCHLORIDE IN SODIUM CHLORIDE 8.6 MICROGRAM(S)/KG/HR: 4 INJECTION INTRAVENOUS at 19:53

## 2025-01-01 RX ADMIN — Medication 3.5 MICROGRAM(S)/KG/HR: at 22:33

## 2025-01-01 RX ADMIN — NOREPINEPHRINE BITARTRATE 6.56 MICROGRAM(S)/KG/MIN: 8 SOLUTION at 07:34

## 2025-01-01 RX ADMIN — CLOPIDOGREL BISULFATE 75 MILLIGRAM(S): 75 TABLET, FILM COATED ORAL at 12:31

## 2025-01-01 RX ADMIN — DEXMEDETOMIDINE HYDROCHLORIDE IN SODIUM CHLORIDE 8.6 MICROGRAM(S)/KG/HR: 4 INJECTION INTRAVENOUS at 22:04

## 2025-01-01 RX ADMIN — NOREPINEPHRINE BITARTRATE 3.23 MICROGRAM(S)/KG/MIN: 8 SOLUTION at 20:59

## 2025-01-01 RX ADMIN — DOBUTAMINE 5.16 MICROGRAM(S)/KG/MIN: 250 INJECTION INTRAVENOUS at 17:03

## 2025-01-01 RX ADMIN — Medication 4 MILLIGRAM(S): at 22:32

## 2025-01-01 RX ADMIN — Medication 15 MILLILITER(S): at 04:44

## 2025-01-01 RX ADMIN — Medication 25.8 MICROGRAM(S)/KG/MIN: at 07:38

## 2025-01-01 RX ADMIN — Medication 50 MILLILITER(S): at 17:44

## 2025-01-01 RX ADMIN — CLOPIDOGREL BISULFATE 600 MILLIGRAM(S): 75 TABLET, FILM COATED ORAL at 17:39

## 2025-01-01 RX ADMIN — AMIODARONE HYDROCHLORIDE 16.7 MG/MIN: 50 INJECTION, SOLUTION INTRAVENOUS at 19:55

## 2025-01-01 RX ADMIN — HEPARIN SODIUM 5000 UNIT(S): 1000 INJECTION INTRAVENOUS; SUBCUTANEOUS at 17:22

## 2025-01-01 RX ADMIN — ADENOSINE 6 MILLIGRAM(S): 3 INJECTION, SOLUTION INTRAVENOUS at 09:10

## 2025-01-01 RX ADMIN — NOREPINEPHRINE BITARTRATE 3.23 MICROGRAM(S)/KG/MIN: 8 SOLUTION at 07:37

## 2025-01-01 RX ADMIN — Medication 25.8 MICROGRAM(S)/KG/MIN: at 19:56

## 2025-01-01 RX ADMIN — INSULIN LISPRO 2: 100 INJECTION, SOLUTION INTRAVENOUS; SUBCUTANEOUS at 17:37

## 2025-01-01 RX ADMIN — Medication 25 GRAM(S): at 15:21

## 2025-01-01 RX ADMIN — Medication 3.5 MICROGRAM(S)/KG/HR: at 07:32

## 2025-01-01 RX ADMIN — DEXMEDETOMIDINE HYDROCHLORIDE IN SODIUM CHLORIDE 8.6 MICROGRAM(S)/KG/HR: 4 INJECTION INTRAVENOUS at 22:07

## 2025-01-01 RX ADMIN — DEXMEDETOMIDINE HYDROCHLORIDE IN SODIUM CHLORIDE 8.6 MICROGRAM(S)/KG/HR: 4 INJECTION INTRAVENOUS at 20:59

## 2025-01-01 RX ADMIN — Medication 5 UNIT(S): at 17:44

## 2025-01-01 RX ADMIN — Medication 25 GRAM(S): at 05:55

## 2025-01-01 RX ADMIN — INSULIN LISPRO 2: 100 INJECTION, SOLUTION INTRAVENOUS; SUBCUTANEOUS at 12:31

## 2025-01-01 RX ADMIN — Medication 1 APPLICATION(S): at 12:40

## 2025-01-01 RX ADMIN — HEPARIN SODIUM 12 UNIT(S)/HR: 1000 INJECTION INTRAVENOUS; SUBCUTANEOUS at 07:32

## 2025-01-01 RX ADMIN — FUROSEMIDE 40 MILLIGRAM(S): 10 INJECTION INTRAMUSCULAR; INTRAVENOUS at 12:30

## 2025-01-01 RX ADMIN — INSULIN LISPRO 2: 100 INJECTION, SOLUTION INTRAVENOUS; SUBCUTANEOUS at 23:26

## 2025-01-01 RX ADMIN — HEPARIN SODIUM 12 UNIT(S)/HR: 1000 INJECTION INTRAVENOUS; SUBCUTANEOUS at 22:40

## 2025-01-01 RX ADMIN — Medication 81 MILLIGRAM(S): at 12:31

## 2025-01-01 RX ADMIN — INSULIN LISPRO 1: 100 INJECTION, SOLUTION INTRAVENOUS; SUBCUTANEOUS at 23:11

## 2025-01-01 RX ADMIN — Medication 1 APPLICATION(S): at 12:32

## 2025-01-01 RX ADMIN — CLOPIDOGREL BISULFATE 75 MILLIGRAM(S): 75 TABLET, FILM COATED ORAL at 11:34

## 2025-01-01 RX ADMIN — HEPARIN SODIUM 12 UNIT(S)/HR: 1000 INJECTION INTRAVENOUS; SUBCUTANEOUS at 04:45

## 2025-01-01 RX ADMIN — Medication 3.5 MICROGRAM(S)/KG/HR: at 04:44

## 2025-01-01 RX ADMIN — Medication 3.44 MICROGRAM(S)/KG/HR: at 07:20

## 2025-01-01 RX ADMIN — Medication 15 MILLILITER(S): at 05:20

## 2025-01-01 RX ADMIN — NOREPINEPHRINE BITARTRATE 6.56 MICROGRAM(S)/KG/MIN: 8 SOLUTION at 07:33

## 2025-01-01 RX ADMIN — Medication 250 MILLILITER(S): at 20:59

## 2025-01-01 RX ADMIN — Medication 400 MILLIGRAM(S): at 22:04

## 2025-01-01 RX ADMIN — Medication 25 GRAM(S): at 21:48

## 2025-01-01 RX ADMIN — NOREPINEPHRINE BITARTRATE 3.23 MICROGRAM(S)/KG/MIN: 8 SOLUTION at 13:00

## 2025-01-01 RX ADMIN — Medication 400 MILLIGRAM(S): at 04:44

## 2025-01-01 RX ADMIN — Medication 25 GRAM(S): at 17:23

## 2025-01-01 RX ADMIN — Medication 1000 MILLILITER(S): at 17:23

## 2025-01-01 RX ADMIN — Medication 250 MILLILITER(S): at 15:51

## 2025-01-01 RX ADMIN — Medication 650 MILLIGRAM(S): at 00:47

## 2025-01-01 RX ADMIN — HEPARIN SODIUM 1200 UNIT(S)/HR: 1000 INJECTION INTRAVENOUS; SUBCUTANEOUS at 10:27

## 2025-01-01 RX ADMIN — Medication 1 APPLICATION(S): at 11:35

## 2025-01-01 RX ADMIN — MILRINONE LACTATE 2.58 MICROGRAM(S)/KG/MIN: 1 INJECTION, SOLUTION INTRAVENOUS at 17:44

## 2025-01-01 RX ADMIN — DEXMEDETOMIDINE HYDROCHLORIDE IN SODIUM CHLORIDE 8.6 MICROGRAM(S)/KG/HR: 4 INJECTION INTRAVENOUS at 07:38

## 2025-01-01 RX ADMIN — DEXMEDETOMIDINE HYDROCHLORIDE IN SODIUM CHLORIDE 8.6 MICROGRAM(S)/KG/HR: 4 INJECTION INTRAVENOUS at 15:00

## 2025-01-01 RX ADMIN — PROPOFOL 6.19 MICROGRAM(S)/KG/MIN: 10 INJECTION, EMULSION INTRAVENOUS at 21:24

## 2025-01-01 RX ADMIN — Medication 5 UNIT(S): at 01:30

## 2025-01-01 RX ADMIN — DEXMEDETOMIDINE HYDROCHLORIDE IN SODIUM CHLORIDE 8.6 MICROGRAM(S)/KG/HR: 4 INJECTION INTRAVENOUS at 07:34

## 2025-01-01 RX ADMIN — DEXMEDETOMIDINE HYDROCHLORIDE IN SODIUM CHLORIDE 8.6 MICROGRAM(S)/KG/HR: 4 INJECTION INTRAVENOUS at 07:21

## 2025-01-01 RX ADMIN — Medication 3.44 MICROGRAM(S)/KG/HR: at 07:36

## 2025-01-01 RX ADMIN — Medication 4 MILLIGRAM(S): at 01:54

## 2025-01-01 RX ADMIN — FUROSEMIDE 40 MILLIGRAM(S): 10 INJECTION INTRAMUSCULAR; INTRAVENOUS at 13:00

## 2025-01-01 RX ADMIN — HEPARIN SODIUM 2500 UNIT(S): 1000 INJECTION INTRAVENOUS; SUBCUTANEOUS at 16:55

## 2025-01-01 RX ADMIN — DEXMEDETOMIDINE HYDROCHLORIDE IN SODIUM CHLORIDE 8.6 MICROGRAM(S)/KG/HR: 4 INJECTION INTRAVENOUS at 04:44

## 2025-01-01 RX ADMIN — Medication 650 MILLIGRAM(S): at 01:47

## 2025-01-01 RX ADMIN — INSULIN LISPRO 2: 100 INJECTION, SOLUTION INTRAVENOUS; SUBCUTANEOUS at 04:44

## 2025-01-01 RX ADMIN — Medication 200 GRAM(S): at 10:42

## 2025-01-01 RX ADMIN — HEPARIN SODIUM 1300 UNIT(S)/HR: 1000 INJECTION INTRAVENOUS; SUBCUTANEOUS at 06:15

## 2025-01-01 RX ADMIN — MILRINONE LACTATE 5.16 MICROGRAM(S)/KG/MIN: 1 INJECTION, SOLUTION INTRAVENOUS at 07:21

## 2025-01-01 RX ADMIN — Medication 81 MILLIGRAM(S): at 11:34

## 2025-01-01 RX ADMIN — Medication 3.44 MICROGRAM(S)/KG/HR: at 19:54

## 2025-01-01 RX ADMIN — LEVETIRACETAM 400 MILLIGRAM(S): 10 INJECTION, SOLUTION INTRAVENOUS at 02:38

## 2025-01-01 RX ADMIN — Medication 15 MILLILITER(S): at 05:22

## 2025-01-01 RX ADMIN — VASOPRESSIN 6 UNIT(S)/MIN: 20 INJECTION INTRAVENOUS at 20:58

## 2025-01-01 RX ADMIN — Medication 250 MILLIGRAM(S): at 13:16

## 2025-01-01 RX ADMIN — NOREPINEPHRINE BITARTRATE 3.23 MICROGRAM(S)/KG/MIN: 8 SOLUTION at 07:20

## 2025-01-01 RX ADMIN — SODIUM ZIRCONIUM CYCLOSILICATE 10 GRAM(S): 5 POWDER, FOR SUSPENSION ORAL at 17:44

## 2025-01-01 RX ADMIN — ATORVASTATIN CALCIUM 80 MILLIGRAM(S): 80 TABLET, FILM COATED ORAL at 23:04

## 2025-01-01 RX ADMIN — Medication 250 MILLILITER(S): at 17:03

## 2025-01-01 RX ADMIN — Medication 250 MILLIGRAM(S): at 04:44

## 2025-01-01 RX ADMIN — Medication 3.5 MICROGRAM(S)/KG/HR: at 17:38

## 2025-01-01 RX ADMIN — Medication 1000 MILLIGRAM(S): at 22:30

## 2025-01-01 RX ADMIN — Medication 324 MILLIGRAM(S): at 17:39

## 2025-01-01 RX ADMIN — NOREPINEPHRINE BITARTRATE 6.56 MICROGRAM(S)/KG/MIN: 8 SOLUTION at 05:32

## 2025-01-01 RX ADMIN — INSULIN LISPRO 1: 100 INJECTION, SOLUTION INTRAVENOUS; SUBCUTANEOUS at 23:36

## 2025-01-01 RX ADMIN — Medication 50 MILLILITER(S): at 01:30

## 2025-01-01 RX ADMIN — Medication 1 APPLICATION(S): at 11:50

## 2025-01-01 RX ADMIN — Medication 81 MILLIGRAM(S): at 12:41

## 2025-01-01 RX ADMIN — Medication 50 MILLIEQUIVALENT(S): at 00:52

## 2025-01-01 RX ADMIN — Medication 25 GRAM(S): at 17:37

## 2025-01-01 RX ADMIN — DEXMEDETOMIDINE HYDROCHLORIDE IN SODIUM CHLORIDE 8.6 MICROGRAM(S)/KG/HR: 4 INJECTION INTRAVENOUS at 13:00

## 2025-01-01 RX ADMIN — Medication 25 GRAM(S): at 05:22

## 2025-01-01 RX ADMIN — HEPARIN SODIUM 800 UNIT(S)/HR: 1000 INJECTION INTRAVENOUS; SUBCUTANEOUS at 22:32

## 2025-01-01 RX ADMIN — MILRINONE LACTATE 5.16 MICROGRAM(S)/KG/MIN: 1 INJECTION, SOLUTION INTRAVENOUS at 20:59

## 2025-01-01 RX ADMIN — Medication 3.44 MICROGRAM(S)/KG/HR: at 20:58

## 2025-01-01 RX ADMIN — VASOPRESSIN 9 UNIT(S)/MIN: 20 INJECTION INTRAVENOUS at 07:37

## 2025-01-01 RX ADMIN — HEPARIN SODIUM 12 UNIT(S)/HR: 1000 INJECTION INTRAVENOUS; SUBCUTANEOUS at 07:35

## 2025-01-01 RX ADMIN — VASOPRESSIN 9 UNIT(S)/MIN: 20 INJECTION INTRAVENOUS at 07:23

## 2025-01-01 RX ADMIN — AMIODARONE HYDROCHLORIDE 618 MILLIGRAM(S): 50 INJECTION, SOLUTION INTRAVENOUS at 08:53

## 2025-01-01 RX ADMIN — Medication 50 MILLIEQUIVALENT(S): at 05:22

## 2025-01-01 RX ADMIN — Medication 15 MILLILITER(S): at 05:21

## 2025-01-01 RX ADMIN — Medication 3.5 MICROGRAM(S)/KG/HR: at 21:24

## 2025-01-01 RX ADMIN — Medication 3.5 MICROGRAM(S)/KG/HR: at 07:34

## 2025-01-01 RX ADMIN — ATORVASTATIN CALCIUM 80 MILLIGRAM(S): 80 TABLET, FILM COATED ORAL at 22:57

## 2025-01-01 RX ADMIN — ATORVASTATIN CALCIUM 80 MILLIGRAM(S): 80 TABLET, FILM COATED ORAL at 21:48

## 2025-01-01 RX ADMIN — NOREPINEPHRINE BITARTRATE 6.56 MICROGRAM(S)/KG/MIN: 8 SOLUTION at 21:24

## 2025-01-01 RX ADMIN — Medication 15 MILLILITER(S): at 17:44

## 2025-01-01 RX ADMIN — SODIUM ZIRCONIUM CYCLOSILICATE 5 GRAM(S): 5 POWDER, FOR SUSPENSION ORAL at 01:30

## 2025-01-01 RX ADMIN — INSULIN LISPRO 2: 100 INJECTION, SOLUTION INTRAVENOUS; SUBCUTANEOUS at 12:40

## 2025-01-01 RX ADMIN — HEPARIN SODIUM 1300 UNIT(S)/HR: 1000 INJECTION INTRAVENOUS; SUBCUTANEOUS at 00:05

## 2025-01-01 RX ADMIN — Medication 1000 MILLIGRAM(S): at 05:00

## 2025-01-01 RX ADMIN — NOREPINEPHRINE BITARTRATE 3.23 MICROGRAM(S)/KG/MIN: 8 SOLUTION at 19:53

## 2025-01-01 RX ADMIN — HEPARIN SODIUM 1300 UNIT(S)/HR: 1000 INJECTION INTRAVENOUS; SUBCUTANEOUS at 19:57

## 2025-01-01 RX ADMIN — VASOPRESSIN 6 UNIT(S)/MIN: 20 INJECTION INTRAVENOUS at 13:16

## 2025-01-01 RX ADMIN — Medication 15 MILLILITER(S): at 17:36

## 2025-01-01 RX ADMIN — Medication 25.8 MICROGRAM(S)/KG/MIN: at 11:35

## 2025-01-01 RX ADMIN — AMIODARONE HYDROCHLORIDE 33.3 MG/MIN: 50 INJECTION, SOLUTION INTRAVENOUS at 08:54

## 2025-01-01 RX ADMIN — HEPARIN SODIUM 1300 UNIT(S)/HR: 1000 INJECTION INTRAVENOUS; SUBCUTANEOUS at 07:39

## 2025-01-01 RX ADMIN — AMIODARONE HYDROCHLORIDE 16.7 MG/MIN: 50 INJECTION, SOLUTION INTRAVENOUS at 07:37

## 2025-01-01 RX ADMIN — Medication 15 MILLILITER(S): at 17:38

## 2025-01-01 RX ADMIN — VASOPRESSIN 9 UNIT(S)/MIN: 20 INJECTION INTRAVENOUS at 19:52

## 2025-01-01 RX ADMIN — CLOPIDOGREL BISULFATE 75 MILLIGRAM(S): 75 TABLET, FILM COATED ORAL at 12:41

## 2025-01-01 RX ADMIN — HEPARIN SODIUM 1300 UNIT(S)/HR: 1000 INJECTION INTRAVENOUS; SUBCUTANEOUS at 16:52

## 2025-01-01 RX ADMIN — Medication 3.5 MICROGRAM(S)/KG/HR: at 05:31

## 2025-01-01 RX ADMIN — AMIODARONE HYDROCHLORIDE 618 MILLIGRAM(S): 50 INJECTION, SOLUTION INTRAVENOUS at 09:10

## 2025-01-01 RX ADMIN — NOREPINEPHRINE BITARTRATE 6.56 MICROGRAM(S)/KG/MIN: 8 SOLUTION at 17:22

## 2025-02-23 NOTE — PATIENT PROFILE ADULT - FALL HARM RISK - FALL HARM RISK
Patient Education       Cystoscopy Discharge Instructions   About this topic   Your kidneys make urine. It is stored in your bladder. The urethra is a tube at the bottom of the bladder. Urine flows out of this tube. Sometimes, there is a blockage and urine is not able to leave the body.  A cystoscopy is a procedure that lets the doctor see the inside of your bladder and urethra. The doctor does it to:  Look for stones or tumors blocking the bladder and urethra  Look for changes or injury inside the bladder  Take a tissue sample from the inside of your bladder  Look for reasons for blood in the urine, pain with urination, or why you are passing urine often  Look for prostate problems     What care is needed at home?   Ask your doctor what you need to do when you go home. Make sure you ask questions if you do not understand what the doctor says. This way you will know what you need to do.  Take a warm bath or use a warm wet washcloth over the opening to the urethra. This will help to ease any pain. Do this as needed.  Drink 6 to 8 glasses of water a day and 3 to 4 glasses in the first few hours after the procedure to flush out your bladder and reduce irritation.  You may see some blood in your urine for a few days. This is normal.  Empty your bladder as soon as you feel the need to. Don't delay going to the bathroom. It stretches and weakens the bladder.  What follow-up care is needed?   Your doctor may ask you to make visits to the office to check on your progress. Be sure to keep these visits.  If you had a biopsy, talk with your doctor about the results.  What drugs may be needed?   The doctor may order drugs to:  Help with pain  Fight an infection  Help with bladder spasms  Will physical activity be limited?   Talk to your doctor about when you may go back to your normal activities like work, driving, or sex.  What problems could happen?   Bleeding  Infection  Injury to the bladder and urethra  Discomfort in the  urethra area  Burning sensation for a short time  Upset stomach  When do I need to call the doctor?   Signs of infection. These include a fever of 100.4°F (38°C) or higher, chills, pain with passing urine.  Pain that does not go away even with drugs or that lasts longer than 2 days  Too much blood in your urine  Passing large dime-sized clots  Cloudy urine  Little or no urine or not able to pass urine  Abdominal pain and nausea  Teach Back: Helping You Understand   The Teach Back Method helps you understand the information we are giving you. After you talk with the staff, tell them in your own words what you learned. This helps to make sure the staff has described each thing clearly. It also helps to explain things that may have been confusing. Before going home, make sure you can do these:  I can tell you about my procedure.  I can tell you what may help ease my pain.  I can tell you what I will do if I have a fever, chills, or am not able to pass urine.  Where can I learn more?   American Cancer Society  https://www.cancer.org/treatment/understanding-your-diagnosis/tests/endoscopy/cystoscopy.html   [employer]  https://www.cancerresearchuk.org/about-cancer/bladder-cancer/getting-diagnosed/tests-diagnose/cystoscopy   NHS Choices  http://www.nhs.uk/conditions/Cystoscopy/Pages/Introduction.aspx   Last Reviewed Date   2021-04-22  Consumer Information Use and Disclaimer   This information is not specific medical advice and does not replace information you receive from your health care provider. This is only a brief summary of general information. It does NOT include all information about conditions, illnesses, injuries, tests, procedures, treatments, therapies, discharge instructions or life-style choices that may apply to you. You must talk with your health care provider for complete information about your health and treatment options. This information should not be used to decide whether or not to accept your  health care providers advice, instructions or recommendations. Only your health care provider has the knowledge and training to provide advice that is right for you.  Copyright   Copyright © 2021 [employer] and its affiliates and/or licensors. All rights reserved.     Coagulation/Other

## 2025-02-23 NOTE — ED ADULT TRIAGE NOTE - CHIEF COMPLAINT QUOTE
Notification called for post cardiac arrest. Pt. brought straight to rm 18. Pt. intubated in the field.

## 2025-02-23 NOTE — PATIENT PROFILE ADULT - NSFALLSECTIONLABEL_GEN_A_CORE
(2) assistive person . PAST MEDICAL HISTORY:  Anemia     Atherosclerotic PVD with ulceration     Hypercholesteremia     Hypertension     Type 2 diabetes mellitus

## 2025-02-23 NOTE — PATIENT PROFILE ADULT - FALL HARM RISK - HARM RISK INTERVENTIONS

## 2025-02-23 NOTE — H&P ADULT - ASSESSMENT
85M no known PMH brought into [facility] ED post Cardiac Arrest intubated and on levophed found to have AWMI brought urgently to Cath Lab with Dr. Greenberg.     PLAN  === NEUROLOGY ===      #Unresponsive post Cardiac Arrest  Intubated and sedated on Ventilator        === RESPIRATORY ===  # Intibated and Vented post Cardiac Arrest  AC Rate 16  +8 100%      === CARDIOVASCULAR ===    #Post Cardiac Arrest - Cardiogenic Shock  - Continue Levophed to maintain MAP > 65mmHg      # AWMI  -  Received Aspirin Load and Plavix load, Heparin Bolus  - Aspirin 81mg daily  - Plavix 75mg daily  - Lipitor 80mg daily  - TTE to evaluate LV function  - Trend Cardiac Enzymes  - Daily CBC, BMP MAg, Phos  - Obtain HgA1c, TSH, Lipid Profile, pBNP, T+S      === RENAL/ ===  Creatinine 1.24    === GASTROINTESTINAL ===  OG Tube  NPO     === ENDO  ===  - HgA1c      === HEMATOLOGIC/ONC ===  # No issues    === INFECTIOUS DISEASE ===  -# MRSA Swabl      Dispo: Maintain in CCU while critically ill.     Ethics: Full code    85M no known PMH brought into [facility] ED post Cardiac Arrest intubated and on levophed found to have AWMI brought urgently to Cath Lab with Dr. Greenberg.       PLAN  ======= NEUROLOGY ===========  #Unresponsive post Cardiac Arrest  Intubated and sedated on Ventilator  - Fentanyl for RASS goal -2 to -3  - Propofol gtt for sedation/VT supression-> initial rhythm per EMS was VFib      ======= RESPIRATORY ===============  # Intubated post Cardiac Arrest  - AC Rate 16  +8 100%  - Plan for A line placement for monitoring of critical patient and collection of serial ABGs  - Daily CXR       ======= CARDIOVASCULAR ==============  #Post Cardiac Arrest   #Cardiogenic Shock  - Continue Levophed to maintain MAP > 65mmHg  - R fem IABP placed for perfusion; CO 3.03-> trigger ECG 1:1, augmenting 110s  - RHC: RA 10 mmHg. PA 45/13/26 mmHg. PCW 12 mmHg. Cardiac index 3.03 L/min/m2. SVR 1088 dsc.    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%, LCx okay, anomalous RCA   - Tentative PCI pending neurologic recovery as d/w Dr Greenberg  - Aspirin 81mg daily  - Plavix 75mg daily  - Lipitor 80mg daily  - TTE to evaluate LV function  - Trend Cardiac Enzymes  - Daily CBC, BMP MAg, Phos  - Obtain HgA1c, TSH, Lipid Profile, pBNP, T+S      ====== RENAL/  ===============  Creatinine 1.24      ======= GASTROINTESTINAL =========  #OG Tube  #NPO  - Monitor LFTs  - Obtain lipid profile       ======= ENDOCRINE  ==========  Obtain HgbA1C and TSH      ======== HEMATOLOGIC/ONC =========  #Leukocytosis  - Likely reactive post cardiac arrest/AWMI      ======== INFECTIOUS DISEASE ==========  #MRSA Swab       =============== ====== PPX ===============================  VTE: Eventual Heparin gtt while w/IABP  Stress ulcer: IV PPI qd      ====================DISPOSITION================================  #Maintain in CCU while critically ill  #FULL CODE as d/w patient's sons at bedside  #Ongoing GOC

## 2025-02-23 NOTE — ED ADULT NURSE NOTE - OBJECTIVE STATEMENT
pt torm  18.ems states pt was unresponsive upon their  arrival--  given  epi  x4, shocked  x 8,  magnesium  2 grams, 10 mg valium iv   prior  to  arrival. arrives  intubated 7.5   et tube-- to  vent -tv 450,peep 8, ac  16, fio2 60 percent.iv  access, schroeder  inserted sbd.  iv access 20  angio  to r ac,r  foream in  ed. arrived with 18 angio  to  l arm. cardiology to  evaluate  at present.family at bedside to give verbal  consent   tocardiology np.

## 2025-02-23 NOTE — ED PROVIDER NOTE - PHYSICAL EXAMINATION
see mdm General: intubated   HEENT: ncat  Neck: trachea ml   CV: pp 2+   Resp: intubated   Abd: non-distended  MSK: no deformities   Neuro: sedated

## 2025-02-23 NOTE — ED PROVIDER NOTE - CLINICAL SUMMARY MEDICAL DECISION MAKING FREE TEXT BOX
83 y/o male unknown hx presents post arrest. He is hemodynamically stable, afebrile, on arrival patient in sinus rhythm with BP 80s/50s, pupils 3 mm reactive, initiating own breaths on ventilator, bilateral breath sounds noted. ET tube placement confirmed with glide. EKG w/ ST elevations anterior leads w/ reciprocal depressions inferior leads. Arrest etiology likely 2/2 MI. Code STEMI called, cardiology saw patient, will take to cath lab. Patient loaded w/ heparin, aspirin, plavix, placed on levo drip + fentanyl drip, BP stabilized with MAP ~80.

## 2025-02-23 NOTE — CHART NOTE - NSCHARTNOTEFT_GEN_A_CORE
Removal of venous Femoral Sheath    Pulses in the lower extremity are audible by doppler. The patient was placed in the supine position. The insertion site was identified and the sutures were removed per protocol.  The 7 Spanish venous femoral sheath was then removed. Direct pressure was applied for 20 minutes.     Monitoring of the groin and both lower extremities including neuro-vascular checks and vital signs every 15 minutes x 4, then every 30 minutes x 2, then every 1 hour was ordered.    Complications: None/Other

## 2025-02-23 NOTE — H&P ADULT - NSHPPHYSICALEXAM_GEN_ALL_CORE
T(C): 35.3 (02-23-25 @ 17:54), Max: 35.3 (02-23-25 @ 17:54)  HR: 93 (02-23-25 @ 17:54) (88 - 96)  BP: 104/72 (02-23-25 @ 17:54) (83/56 - 119/72)  RR: 16 (02-23-25 @ 17:54) (16 - 16)  SpO2: 100% (02-23-25 @ 17:54) (93% - 100%)    CONSTITUTIONAL: Severely ill, intubated and sedated  EYES: PERRLA Fixed sedated on Fentanyl  ENMT: Intubated   RESP: Intubated and vented  CV: RRR, +S1S2, on Levophed  GI: OG Tube Distended  NEURO: Post Cardiac Arrest. Sedated and Vented

## 2025-02-23 NOTE — ED PROVIDER NOTE - OBJECTIVE STATEMENT
83 y/o male unknown hx presents post arrest. Per EMS, patient complained of chest pain before collapsing. Estimated down time 10 minutes, EMS unsure if family initiated CPR before arrival (BLS first on scene, ACLS transported). Patient presenting rhythm v fib, shocked 6 times, given 100 lidocaine, 30mcg epi, 2g magnesium. Patient with ROSC 10 minutes before arrival in ED, initiating breaths.

## 2025-02-23 NOTE — ED ADULT NURSE NOTE - CAS DISCH BELONGINGS RETURNED
Final time out performed, patient and procedure verification agreed on by all staff - RN, Tech, MD and CRNA. Pt adequately sedated.    Yes

## 2025-02-23 NOTE — H&P ADULT - HISTORY OF PRESENT ILLNESS
This is an 85 year old man unknown [facility] brought in by EMS post Cardiac Arrest intubated and on levophed.  Son Chacho Mckeeam at bedside to answer questions.  Lenin No contacted by phone.  Lenin Almanza reports patient called stating he wasnt feeling well at 2:30pm. Son arrived home at 2:45 patient stating he is having chest pain and grabbing chest.  Son called friend who is a nurse he told him to call 911 and did so.  While son was on phone, father collapsed to floor not breathing.  Son waited for EMS to arrive waiting 10 minutes.  EMS-BLS initiated CPR at that time EMS ACLS arrived 2 minutes later and took over.  Initial rhythm was VFib and was shocked. Patient intubated with total CPR time 1 hour during which ROSC achieved 8 times lasting 20-30 seconds before CPR had to be resumed, patient received 4-8 shocks, 30mg of Epinephrine, 100mg of Lidocaine and 2mg of Magnesium as reported by EMS.  In ED, patient found to have ST elevations in Anterior leads and Dr. Greenberg initiated Cath Team.  Patient received Aspirin Load, Plavix 600mg Load and given Heparin bolus.  Patient on Levophed 0.05mg with /70, ST 90-100s.  Patient intubated on 100% O2 and Fentanyl for sedation.  Patient transported to Cath Lab.  Family is not aware of father's medical/surgical history.  Lenin Almanza said he saw a medication bottle labelled Lisinopril mg in his father's room.

## 2025-02-23 NOTE — ED ADULT NURSE REASSESSMENT NOTE - NS ED NURSE REASSESS COMMENT FT1
pt transported to cath lab with np, ccu rn ,ed tech,resp therapy, pt received to cath lab.  remains unresponsive to painful stimuli, rsr c  monitor. levophed ,infusing as ordered ,fentanyl infusing as ordered.

## 2025-02-23 NOTE — ED PROVIDER NOTE - ATTENDING CONTRIBUTION TO CARE
Stalin Christian, DO: I have personally provided the amount of critical care time documented below, concurrently with the Resident/Fellow. This time excludes time spent on separate procedures and time spent teaching. I have reviewed the Resident's/Fellow's documentation and I agree with the assessment and plan of care. 84 YOM, no known reported PMH, PW s/p cardiac arrest.  Per EMS bedside, approximate 1 hour prior to arrival patient had 10-minute downtime, CP started by EMS upon arrival.  Had several rounds of CPR, medications listed earlier note.  Had ROSC.  Patient brought here intubated.  Family bedside, provides some history however states that he had no known medical problems.  Was in usual state of health.  Possibly was grasping his chest prior to falling.  Patient has concern for inferior wall STEMI.  Code STEMI activated upon receiving EKG.  Coordination with cardiology, patient to go to Cath Lab.  Patient is on Levophed, also receiving some IVF.  ACS protocol being followed per cardiology.

## 2025-02-24 NOTE — PROGRESS NOTE ADULT - ASSESSMENT
Assessment:  · Assessment	  85M no known PMH brought into [facility] ED post Cardiac Arrest intubated and on levophed found to have AWMI brought urgently to Cath Lab with Dr. Greenberg.       PLAN  ======= NEUROLOGY ===========  #Unresponsive post Cardiac Arrest  Intubated and sedated on Ventilator  - Fentanyl for RASS goal -2 to -3  - Propofol gtt for sedation/VT supression-> initial rhythm per EMS was VFib      ======= RESPIRATORY ===============  # Intubated post Cardiac Arrest  - AC Rate 16  +8 100%  - Plan for A line placement for monitoring of critical patient and collection of serial ABGs  - Daily CXR       ======= CARDIOVASCULAR ==============  #Post Cardiac Arrest   #Cardiogenic Shock  - Continue Levophed to maintain MAP > 65mmHg  - R fem IABP placed for perfusion; CO 3.03-> trigger ECG 1:1, augmenting 110s  - RHC: RA 10 mmHg. PA 45/13/26 mmHg. PCW 12 mmHg. Cardiac index 3.03 L/min/m2. SVR 1088 dsc.    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%, LCx okay, anomalous RCA   - R fem IABP placed for perfusion; CO 3.03  - RHC: RA 10 mmHg. PA 45/13/26 mmHg. PCW 12 mmHg. Cardiac index 3.03 L/min/m2. SVR 1088 dsc.  - Tentative PCI pending neurologic recovery as d/w Dr Greenberg  - Aspirin 81mg daily  - Plavix 75mg daily  - Lipitor 80mg daily  - TTE to evaluate LV function  - Trend Cardiac Enzymes  - Daily CBC, BMP MAg, Phos  - Obtain HgA1c, TSH, Lipid Profile, pBNP, T+S      ====== RENAL/  ===============  Creatinine 1.24      ======= GASTROINTESTINAL =========  #OG Tube  #NPO  - Monitor LFTs  - Obtain lipid profile       ======= ENDOCRINE  ==========  Obtain HgbA1C and TSH      ======== HEMATOLOGIC/ONC =========  #Leukocytosis  - Likely reactive post cardiac arrest/AWMI      ======== INFECTIOUS DISEASE ==========  #MRSA Swab       =============== ====== PPX ===============================  VTE: Eventual Heparin gtt while w/IABP  Stress ulcer: IV PPI qd      ====================DISPOSITION================================  #Maintain in CCU while critically ill  #FULL CODE as d/w patient's sons at bedside  #Ongoing GOC   Assessment:  · Assessment	  85M no known PMH brought into [facility] ED post Cardiac Arrest, down 10 minutes before cpr and 1 hour before rosc, intubated and on levophed found to have AWMI brought urgently to Cath Lab with Dr. Greenberg.       PLAN  ======= NEUROLOGY ===========  #Unresponsive post Cardiac Arrest  Intubated and sedated on Ventilator  - Fentanyl for RASS goal -2 to -3  - Propofol gtt for sedation/VT supression-> initial rhythm per EMS was VFib  - CTH: IMPRESSION: No acute intracranial hemorrhage, mass effect, or shift of the midline structures  If diffuse anoxic brain injury remains a persistent clinical concern, recommend a short-term interval follow-up CT examination.  -Neuro rec appreciated:   Myoclonic jerks and other movements common after cardiac arrest - difficult to distinguish between epileptic or nonepileptic  Concern for anoxic encephalopathy indicative of significant brain injury.   neurologic prognosis likely poor based on total downtime at least 20 minutes, myoclonus, and absence of some brainstem reflexes. (current exam could be impacted by sedation)  F/u neuron specific enolase  [ ]EEG       ======= RESPIRATORY ===============  # Intubated post Cardiac Arrest  - AC Rate 450/16/40/5   - Daily CXR       ======= CARDIOVASCULAR ==============    # AWMI  cardiac arrest  - - s/p LHC: severe pLAD stenosis 90%, mLAD 50-60%, LCx okay, anomalous RCA. No PCI  - R fem IABP placed for perfusion;   - RHC: RA 10 mmHg. PA 45/13/26 mmHg. PCW 12 mmHg. Cardiac index 3.03 L/min/m2. SVR 1088 dsc.  - Continue Levophed to maintain MAP > 65mmHg  - Tentative PCI pending neurologic recovery as d/w Dr Greenberg  - Aspirin 81mg daily  - Plavix 75mg daily  - Lipitor 80mg daily    -HFrEF   - Left ventricular systolic function is mildly to moderately decreased with an ejection fraction visually estimated at 45 %. Regional wall motion abnormalities present.   - There is hypokinesis of the apical septal, mid anteroseptal, mid inferoseptal and apical walls.  -CVP Q4 - Lasix 40 IVP for CVP >10      ====== RENAL/  ===============    BLUE  likely hemodynamically mediated in setting of cardiac arrest,   - elevated Scr of 1.32    -Trend BUN/Cr.  -Strict I/O   Hold ACE-I in setting of BLUE . Avoid Nephrotoxic Meds/ Agents   -Adjust Medications according to eGFR      ======= GASTROINTESTINAL =========  #OG Tube  # Glucerna TF up titrating to 40ml/hr  - LFTs normalized  -  lipid profile    LIPIDS  chol m152  triglycerides 59  HDL 56  LDL 84        ======= ENDOCRINE  ==========  - HgbA1C: 6.1   - TSH 1.5      ======== HEMATOLOGIC/ONC =========  #Leukocytosis  - Likely reactive post cardiac arrest/AWMI      ======== INFECTIOUS DISEASE ==========  #MRSA Swab       =============== ====== PPX ===============================  VTE:  Heparin gtt while w/IABP  Stress ulcer: IV PPI qd      ====================DISPOSITION================================  #Maintain in CCU while critically ill  #FULL CODE as d/w patient's sons at bedside    ===================LINES=====================================  RIJ triple lumen 2/24/  L radial A-line 2/23  PIV 2/23  #Ongoing GOC

## 2025-02-24 NOTE — PROGRESS NOTE ADULT - CRITICAL CARE ATTENDING COMMENT
84 year old man with HTN who had witnessed arrest at home. EMS alonzo manjit CPR started when they arrived after 10 min. Coded x 1 hour in the field and brought to [facility] emergently. Had severe pLAD disease. RHC showed CI 3/RA 10/PCWP 12/SVR 1000  IABP placed  Overnight- seizure like activity with myoclonic movements    CT head:   No acute intracranial hemorrhage, mass effect, or shift of   the midline structures.  Imaging findings for which normal pressure or communicating hydrocephalus   can be considered in the correct clinical scenario.  If diffuse anoxic brain injury remains a persistent clinical concern,   recommend a short-term interval follow-up CT examination.    Meds:  Levophed gtt  Fentanyl gtt  Propofol gtt  Heparin ggt  ASA 81 mg daily  Plavix 5 mg daily  Atorvastatin 80 mg daily    #Neuro- Neuro consult called  Having myoclonic movement on sedation concern for anoxia  Neuron specific enolase sent  CT head nondiagnostic  may benefit from EEG  #Pulm- Intubated  Continue vent support  #CV- AWMI with out of hospital arrest (initial rhythm VF)  Now with IABP in place  on DAPT/statin  Levophed for BP support  TTE today  #Renal- No active issues

## 2025-02-24 NOTE — CONSULT NOTE ADULT - ASSESSMENT
85 year old gentleman with extensive cardiac disease (CAD) found down after cardiac arrest with total down time of approximately 1 hour.       Impression: Severe cerebral dysfunction. Comatose patient, per primary team, having possible occasional myoclonic jerks in the setting of cardiac arrest. Myoclonic jerks and other movements are common after cardiac arrest however sometimes difficult to distinguish between epileptic or nonepileptic, however in this case more likely to be nonepileptic. Ultimate neurologic prognosis for meaningful recovery at this time is unclear. Noted ROSC after around 1 hour.      Recommendations:  - Patient with possible myoclonus in the setting of cardiac arrest and anoxic encephalopathy indicative of significant brain injury. Unclear neurologic prognosis at this time but likely poor based on total downtime at least 20 minutes, myoclonus, and absence of some brainstem reflexes. However, current exam could be impacted by sedation  [] Draw neuron specific enolase 24 hours s/p cardiac arrest, and 48-72 hours after (<30 better prognosis, 30-60 indeterminate, >60 poor prognosis)  [] vEEG - to evaluate for focal slowing, epileptiform discharges, or seizures (event capture)   [] MRI brain w/o contrast to evaluate for anoxic brain injury (cortical injury between day 2-7 post-cardiac arrest and subcortical injury day 5-10 post-cardiac arrest and brainstem injury day 7-14)  [x] CTH: nonspecific ventriculomegaly likely indicative of age appropriate atrophy   [] Avoid benzodiazepine or propofol for sedation, Precedex, fentanyl, or hydromorphone OK  [] Avoid sedating agents that can significantly affect EEG, such as benzodiazepines and propofol. Dexmedetomidine, fentanyl, and hydromorphone are OK.  [] GOC per primary team    Case to be seen by attending.

## 2025-02-24 NOTE — CONSULT NOTE ADULT - SUBJECTIVE AND OBJECTIVE BOX
Neurology - Consult Note    -  Spectra: [phone] ([facility]), [phone] ([facility])  -    HPI: Patient ALLISON BOJORQUEZ is a 84y (1940) man brought in by EMS post witnessed Cardiac Arrest intubated and on levophed.  Pt down approx 10 minutes before EMS-BLS initiated CPR.  Initial rhythm was VFib and was shocked. Patient intubated with total CPR time 1 hour during which ROSC achieved 8 times lasting 20-30 seconds before CPR had to be resumed, patient received 4-8 shocks, 30mg of Epinephrine, 100mg of Lidocaine and 2mg of Magnesium as reported by EMS.    In ED, patient found to have ST elevations in Anterior leads and Dr. Greenberg initiated Cath Team.  Patient received Aspirin Load, Plavix 600mg Load and given Heparin bolus.  Patient on Levophed 0.05mg with /70, ST 90-100s.  Patient intubated on 100% O2 and Fentanyl for sedation.  Patient transported to Cath Lab, however no intervention performed given mental status. Neuro consulted for prognostication s/p cardiac arrest      Review of Systems: NEUROLOGICAL: +As stated in HPI above    All other review of systems is negative unless indicated above.    Allergies:  No Known Allergies      PMHx/PSHx/Family Hx: As above, otherwise see below       Social Hx:  No current use of tobacco, alcohol, or illicit drugs      Medications:  MEDICATIONS  (STANDING):  aspirin  chewable 81 milliGRAM(s) Oral daily  atorvastatin 80 milliGRAM(s) Oral at bedtime  chlorhexidine 0.12% Liquid 15 milliLiter(s) Oral Mucosa every 12 hours  chlorhexidine 2% Cloths 1 Application(s) Topical daily  clopidogrel Tablet 75 milliGRAM(s) Oral daily  dextrose 5%. 1000 milliLiter(s) (50 mL/Hr) IV Continuous <Continuous>  dextrose 5%. 1000 milliLiter(s) (100 mL/Hr) IV Continuous <Continuous>  dextrose 50% Injectable 25 Gram(s) IV Push once  dextrose 50% Injectable 12.5 Gram(s) IV Push once  dextrose 50% Injectable 25 Gram(s) IV Push once  fentaNYL   Infusion. 0.509 MICROgram(s)/kG/Hr (3.5 mL/Hr) IV Continuous <Continuous>  glucagon  Injectable 1 milliGRAM(s) IntraMuscular once  heparin  Infusion 1200 Unit(s)/Hr (12 mL/Hr) IV Continuous <Continuous>  influenza  Vaccine (HIGH DOSE) 0.5 milliLiter(s) IntraMuscular once  insulin lispro (ADMELOG) corrective regimen sliding scale   SubCutaneous every 6 hours  insulin lispro (ADMELOG) corrective regimen sliding scale   SubCutaneous at bedtime  norepinephrine Infusion 0.05 MICROgram(s)/kG/Min (6.56 mL/Hr) IV Continuous <Continuous>  propofol Infusion 15 MICROgram(s)/kG/Min (6.19 mL/Hr) IV Continuous <Continuous>    MEDICATIONS  (PRN):  dextrose Oral Gel 15 Gram(s) Oral once PRN Blood Glucose LESS THAN 70 milliGRAM(s)/deciliter      Vitals:  T(C): 37.5 (02-24-25 @ 08:00), Max: 37.5 (02-24-25 @ 07:00)  HR: 67 (02-24-25 @ 10:00) (67 - 96)  BP: 107/66 (02-24-25 @ 00:00) (83/56 - 127/75)  RR: 15 (02-24-25 @ 10:00) (13 - 44)  SpO2: 100% (02-24-25 @ 10:00) (93% - 100%)      Physical Examination:   Gen - Intubated, sedated with propofol and fentanyl    CV - Peripheral circulation intact, no evidence of edema  Eyes - Fundoscopy not well visualized    Neurologic exam:  MS - Intubated, sedated, comatose, no speech output, does not follow commands. PHOEBE orientation, rep/naming, attn/conc/recent and remote memory/fund of knowledge  CN- Pupils b/l equal 2mm with very sluggish reaction, corneal reflex not present, face symmetric, rest of cranial nerves exam is limited by mental status  Motor - Normal bulk and tone throughout.   Sensory: no response to noxious stimulation  DTR's - 2+ BR b/l, 1+ rest, and neutral b/l plantar response  Coord - PHOEBE  Gait and station - PHOEBE    Labs:                        12.6   15.54 )-----------( 323      ( 24 Feb 2025 08:00 )             38.5     02-24    142  |  109[H]  |  26[H]  ----------------------------<  162[H]  4.2   |  19[L]  |  1.32[H]    Ca    8.1[L]      24 Feb 2025 08:00  Phos  3.3     02-24  Mg     2.60     02-24    TPro  6.4  /  Alb  3.5  /  TBili  0.6  /  DBili  x   /  AST  195[H]  /  ALT  61[H]  /  AlkPhos  114  02-24    CAPILLARY BLOOD GLUCOSE      POCT Blood Glucose.: 133 mg/dL (24 Feb 2025 11:21)    LIVER FUNCTIONS - ( 24 Feb 2025 08:00 )  Alb: 3.5 g/dL / Pro: 6.4 g/dL / ALK PHOS: 114 U/L / ALT: 61 U/L / AST: 195 U/L / GGT: x             PT/INR - ( 24 Feb 2025 04:40 )   PT: 12.5 sec;   INR: 1.05 ratio         PTT - ( 24 Feb 2025 11:01 )  PTT:89.2 sec  CSF:                  Radiology:  CT Head No Cont:  (24 Feb 2025 08:59)     Neurology - Consult Note    -  Spectra: [phone] ([facility]), [phone] ([facility])  -    HPI: Patient ALLISON BOJORQUEZ is a 84y (1940) man brought in by EMS post witnessed Cardiac Arrest intubated and on levophed.  Pt down approx 10 minutes before EMS-BLS initiated CPR.  Initial rhythm was VFib and was shocked. Patient intubated with total CPR time 1 hour during which ROSC achieved 8 times lasting 20-30 seconds before CPR had to be resumed, patient received 4-8 shocks, 30mg of Epinephrine, 100mg of Lidocaine and 2mg of Magnesium as reported by EMS.    In ED, patient found to have ST elevations in Anterior leads and Dr. Greenberg initiated Cath Team.  Patient received Aspirin Load, Plavix 600mg Load and given Heparin bolus.  Patient on Levophed 0.05mg with /70, ST 90-100s.  Patient intubated on 100% O2 and Fentanyl for sedation.  Patient transported to Cath Lab,transferred to CCU s/p LHC: severe pLAD stenosis 90%, mLAD 50-60%, LCx okay, anomalous RCA R fem IABP placed for perfusion;    however no other intervention performed given mental status. Neuro consulted for prognostication s/p cardiac arrest.      Review of Systems: NEUROLOGICAL: +As stated in HPI above    All other review of systems is negative unless indicated above.    Allergies:  No Known Allergies      PMHx/PSHx/Family Hx: As above, otherwise see below       Social Hx:  No current use of tobacco, alcohol, or illicit drugs      Medications:  MEDICATIONS  (STANDING):  aspirin  chewable 81 milliGRAM(s) Oral daily  atorvastatin 80 milliGRAM(s) Oral at bedtime  chlorhexidine 0.12% Liquid 15 milliLiter(s) Oral Mucosa every 12 hours  chlorhexidine 2% Cloths 1 Application(s) Topical daily  clopidogrel Tablet 75 milliGRAM(s) Oral daily  dextrose 5%. 1000 milliLiter(s) (50 mL/Hr) IV Continuous <Continuous>  dextrose 5%. 1000 milliLiter(s) (100 mL/Hr) IV Continuous <Continuous>  dextrose 50% Injectable 25 Gram(s) IV Push once  dextrose 50% Injectable 12.5 Gram(s) IV Push once  dextrose 50% Injectable 25 Gram(s) IV Push once  fentaNYL   Infusion. 0.509 MICROgram(s)/kG/Hr (3.5 mL/Hr) IV Continuous <Continuous>  glucagon  Injectable 1 milliGRAM(s) IntraMuscular once  heparin  Infusion 1200 Unit(s)/Hr (12 mL/Hr) IV Continuous <Continuous>  influenza  Vaccine (HIGH DOSE) 0.5 milliLiter(s) IntraMuscular once  insulin lispro (ADMELOG) corrective regimen sliding scale   SubCutaneous every 6 hours  insulin lispro (ADMELOG) corrective regimen sliding scale   SubCutaneous at bedtime  norepinephrine Infusion 0.05 MICROgram(s)/kG/Min (6.56 mL/Hr) IV Continuous <Continuous>  propofol Infusion 15 MICROgram(s)/kG/Min (6.19 mL/Hr) IV Continuous <Continuous>    MEDICATIONS  (PRN):  dextrose Oral Gel 15 Gram(s) Oral once PRN Blood Glucose LESS THAN 70 milliGRAM(s)/deciliter      Vitals:  T(C): 37.5 (02-24-25 @ 08:00), Max: 37.5 (02-24-25 @ 07:00)  HR: 67 (02-24-25 @ 10:00) (67 - 96)  BP: 107/66 (02-24-25 @ 00:00) (83/56 - 127/75)  RR: 15 (02-24-25 @ 10:00) (13 - 44)  SpO2: 100% (02-24-25 @ 10:00) (93% - 100%)      Physical Examination:   Gen - Intubated, sedated with propofol and fentanyl    CV - Peripheral circulation intact, no evidence of edema  Eyes - Fundoscopy not well visualized    Neurologic exam:  MS - Intubated, sedated, comatose, no speech output, does not follow commands. PHOEBE orientation, rep/naming, attn/conc/recent and remote memory/fund of knowledge  CN- Pupils b/l equal 2mm with very sluggish reaction, corneal reflex not present, face symmetric, rest of cranial nerves exam is limited by mental status  Motor - Normal bulk and tone throughout.   Sensory: no response to noxious stimulation  DTR's - 2+ BR b/l, 1+ rest, and neutral b/l plantar response  Coord - PHOEBE  Gait and station - PHOEBE    Labs:                        12.6   15.54 )-----------( 323      ( 24 Feb 2025 08:00 )             38.5     02-24    142  |  109[H]  |  26[H]  ----------------------------<  162[H]  4.2   |  19[L]  |  1.32[H]    Ca    8.1[L]      24 Feb 2025 08:00  Phos  3.3     02-24  Mg     2.60     02-24    TPro  6.4  /  Alb  3.5  /  TBili  0.6  /  DBili  x   /  AST  195[H]  /  ALT  61[H]  /  AlkPhos  114  02-24    CAPILLARY BLOOD GLUCOSE      POCT Blood Glucose.: 133 mg/dL (24 Feb 2025 11:21)    LIVER FUNCTIONS - ( 24 Feb 2025 08:00 )  Alb: 3.5 g/dL / Pro: 6.4 g/dL / ALK PHOS: 114 U/L / ALT: 61 U/L / AST: 195 U/L / GGT: x             PT/INR - ( 24 Feb 2025 04:40 )   PT: 12.5 sec;   INR: 1.05 ratio         PTT - ( 24 Feb 2025 11:01 )  PTT:89.2 sec  CSF:                  Radiology:  CT Head No Cont:  (24 Feb 2025 08:59)

## 2025-02-24 NOTE — CONSULT NOTE ADULT - ATTENDING COMMENTS
83 y/o M s/p cardiac arrest. CTH: to my interpretation there is mild to mod brain edema . not waking up clinically with sluggish brain stem reflexes   Impression:  Anoxic brain injury   Plan:  EEG monitoring off sedation   Repeat CTH   will follow

## 2025-02-25 NOTE — DISCHARGE NOTE PROVIDER - HOSPITAL COURSE
This is an 85 year old man unknown [facility] brought in by EMS post witnessed Cardiac Arrest intubated and on levophed.  Pt down approx 10 minutes before EMS-BLS initiated CPR.  Initial rhythm was VFib and was shocked. Patient intubated with total CPR time 1 hour during which ROSC achieved 8 times lasting 20-30 seconds before CPR had to be resumed, patient received 4-8 shocks, 30mg of Epinephrine, 100mg of Lidocaine and 2mg of Magnesium as reported by EMS.    In ED, patient found to have ST elevations in Anterior leads and Dr. Greenberg initiated Cath Team.  Patient received Aspirin Load, Plavix 600mg Load and given Heparin bolus.  Patient on Levophed 0.05mg with /70, ST 90-100s.  Patient intubated on 100% O2 and Fentanyl for sedation.  Patient transported to Cath Lab.    transferred to CCU intubated and sedated on pressors s/p LHC: severe pLAD stenosis 90%, mLAD 50-60%, LCx okay, anomalous RCA   R fem IABP placed for perfusion;   RHC: CO 5.37, CI 3.03, RA 10 mmHg. PA 45/13/26 mmHg. PCW 12 mmHg. Cardiac index 3.03 L/min/m2. SVR 1088 dsc.  ec

## 2025-02-25 NOTE — CHART NOTE - NSCHARTNOTEFT_GEN_A_CORE
Pt spiked temp overnight to 101.9. Blood urine and sputum cultures sent. There was concern for aspiration PNA on cxr,  started zosyn and vanco. Over the course of the day norepinephrine requirements increasing. vasopressin started. New eliud  Lasix 40mg IVP for CVP 15. THALIA: CO 2.4, CI 1.3, CVP 14, MV02 40, SVR 3440, lact 2.9. As pressor requirements increased, UO declined. A bedside POCUS  was done and showed mod pericardial effusion. Heparin gtt dc'd. 500 bolus over 2 hrs. Echo done showing pericardial effusion with c/f early tamponade.  1700: repeat MV02 50 THALIA CO 2.5, CI 1.3, CVP 15, SVR 1696, Lactate 3.9  Another BOLU 250 x 1 hour given iso of worsening acidosis, UO output, lactate acidosis  + creatinine. With Milrinone started and Dialysis looming, GOC discussion with sons done. They are in the process of deciding what their father's wishes would be. Pt spiked temp overnight to 101.9. Blood urine and sputum cultures sent. There was concern for aspiration PNA on cxr,  started zosyn and vanco. Over the course of the day norepinephrine requirements increasing. vasopressin started. New eliud  Lasix 40mg IVP for CVP 15. THALIA: CO 2.4, CI 1.3, CVP 14, MV02 40, SVR 3440, lact 2.9. As pressor requirements increased, UO declined. A bedside POCUS  was done and showed mod pericardial effusion. Heparin gtt dc'd. 500 bolus over 2 hrs. Echo done showing pericardial effusion with c/f early tamponade.  1700: repeat MV02 50 THALIA CO 2.5, CI 1.3, CVP 15, SVR 1696, Lactate 3.9  Another BOLUs 250 x 1 hour given iso of worsening acidosis, UO output, lactate acidosis  + creatinine. With Milrinone started and Dialysis looming, GOC discussion started with sons done. All three sons, Chacho Duvall and Archie agree their father would not want to be on dialysis. Will continue GOC discussions

## 2025-02-25 NOTE — PROGRESS NOTE ADULT - CRITICAL CARE ATTENDING COMMENT
84 year old man with HTN who had witnessed arrest at home. EMS alonzo manjit CPR started when they arrived after 10 min. Coded x 1 hour in the field and brought to [facility] emergently. Had severe pLAD disease. RHC showed CI 3/RA 10/PCWP 12/SVR 1000  IABP placed  Overnight- seizure like activity with myoclonic movements     TTE 2/24/25:  1. Left ventricular systolic function is mildly to moderately decreased with an ejection fraction visually estimated at 45 %. Regional wall motion abnormalities present.   2. There is hypokinesis of the apical septal, mid anteroseptal, mid inferoseptal and apical walls.   3. Normal right ventricular cavity size and normal right ventricular systolic function.   4. Left atrium is normal in size.   5. No significant valvular disease.   6. No pericardial effusion seen.   7. Right pleural effusion noted.   8. The inferior vena cava is normal in size measuring 1.16 cm in diameter, (normal <2.1cm) with normal inspiratory collapse (normal >50%) consistent with normal right atrial pressure (~3, range 0-5mmHg).    CT head:   No acute intracranial hemorrhage, mass effect, or shift of   the midline structures.  Imaging findings for which normal pressure or communicating hydrocephalus   can be considered in the correct clinical scenario.  If diffuse anoxic brain injury remains a persistent clinical concern,   recommend a short-term interval follow-up CT examination.    Meds:  Levophed gtt  Precedex gtt  Fentanyl gtt  Heparin ggt  ASA 81 mg daily  Plavix 5 mg daily  Atorvastatin 80 mg daily    #Neuro- Neuro consult called- input appreciated  Having myoclonic movement on sedation concern for anoxia  Neuron specific enolase sent  CT head nondiagnostic  may benefit from EEG  #Pulm- Intubated  Continue vent support  #CV- AWMI with out of hospital arrest (initial rhythm VF)  Now with IABP in place  on DAPT/statin  Levophed for BP support  #Renal- No active issues  #ID- now febrile; cx sent  will cover with cefepime vanco and dc if cx come back negative 84 year old man with HTN who had witnessed arrest at home. EMS alonzo manjit CPR started when they arrived after 10 min. Coded x 1 hour in the field and brought to [facility] emergently. Had severe pLAD disease. RHC showed CI 3/RA 10/PCWP 12/SVR 1000  IABP placed  Overnight- seizure like activity with myoclonic movements  Febrile 101.9     TTE 2/24/25:  1. Left ventricular systolic function is mildly to moderately decreased with an ejection fraction visually estimated at 45 %. Regional wall motion abnormalities present.   2. There is hypokinesis of the apical septal, mid anteroseptal, mid inferoseptal and apical walls.   3. Normal right ventricular cavity size and normal right ventricular systolic function.   4. Left atrium is normal in size.   5. No significant valvular disease.   6. No pericardial effusion seen.   7. Right pleural effusion noted.   8. The inferior vena cava is normal in size measuring 1.16 cm in diameter, (normal <2.1cm) with normal inspiratory collapse (normal >50%) consistent with normal right atrial pressure (~3, range 0-5mmHg).    CT head:   No acute intracranial hemorrhage, mass effect, or shift of   the midline structures.  Imaging findings for which normal pressure or communicating hydrocephalus   can be considered in the correct clinical scenario.  If diffuse anoxic brain injury remains a persistent clinical concern,   recommend a short-term interval follow-up CT examination.    Meds:  Levophed gtt  Precedex gtt  Fentanyl gtt  Heparin ggt  ASA 81 mg daily  Plavix 5 mg daily  Atorvastatin 80 mg daily    #Neuro- Neuro consult called- input appreciated  Having myoclonic movement on sedation concern for anoxia  Neuron specific enolase sent  CT head nondiagnostic  may benefit from EEG  #Pulm- Intubated  Continue vent support  #CV- AWMI with out of hospital arrest (initial rhythm VF)  Now with IABP in place  on DAPT/statin  Levophed for BP support  #Renal- No active issues  #ID- now febrile; cx sent  will cover with zosyn vanco and dc if cx come back negative

## 2025-02-25 NOTE — CHART NOTE - NSCHARTNOTEFT_GEN_A_CORE
Spoke to patient's son Chacho. He says he and his brothers do not want the patient to undergo hemodialysis. The patient's prognosis was discussed, and he understands that the prognosis is very poor. Given his prognosis, he said he would like the patient to be comfortable. He also shared that his father "would not want to live like this" given his prognosis. He said that his father would want to be comfortable. It was communicated that we could focus on comfort for the patient. He understood that this would involve dialing back on his medical interventions (pressors, inotropes, pericardiocentesis) and focusing on symptom management. He indicated that he would want this for his father given his prognosis, but he said that he would have to discuss this together with his three brothers in order to make a definitive decision.

## 2025-02-25 NOTE — PROGRESS NOTE ADULT - SUBJECTIVE AND OBJECTIVE BOX
CCU Service  Cardiology   Spect: [id number] ([facility])     PATIENT: ALLISON BOJORQUEZ, MRN: [id number]    This is an 85 year old man unknown PMH brought in by EMS post witnessed Cardiac Arrest intubated and on levophed.  Pt down approx 10 minutes before EMS-BLS initiated CPR.  Initial rhythm was VFib and was shocked. Patient intubated with total CPR time 1 hour during which ROSC achieved 8 times lasting 20-30 seconds before CPR had to be resumed, patient received 4-8 shocks, 30mg of Epinephrine, 100mg of Lidocaine and 2mg of Magnesium as reported by EMS.    In ED, patient found to have ST elevations in Anterior leads and Dr. Greenberg initiated Cath Team.  Patient received Aspirin Load, Plavix 600mg Load and given Heparin bolus.  Patient on Levophed 0.05mg with /70, ST 90-100s.  Patient intubated on 100% O2 and Fentanyl for sedation.  Patient transported to Cath Lab.          transferred to CCU s/p LHC: severe pLAD stenosis 90%, mLAD 50-60%, LCx okay, anomalous RCA   R fem IABP placed for perfusion;   RHC: CO 5.37, CI 3.03, RA 10 mmHg. PA 45/13/26 mmHg. PCW 12 mmHg. Cardiac index 3.03 L/min/m2. SVR 1088 dsc.      INTERVAL HISTORY/OVERNIGHT EVENTS:     TELEMETRY:     EKG:       ALLERGIES: Allergies    No Known Allergies    Intolerances        MEDICATIONS:  MEDICATIONS  (STANDING):  aspirin  chewable 81 milliGRAM(s) Oral daily  atorvastatin 80 milliGRAM(s) Oral at bedtime  chlorhexidine 0.12% Liquid 15 milliLiter(s) Oral Mucosa every 12 hours  chlorhexidine 2% Cloths 1 Application(s) Topical daily  clopidogrel Tablet 75 milliGRAM(s) Oral daily  dexMEDEtomidine Infusion 0.5 MICROgram(s)/kG/Hr (8.6 mL/Hr) IV Continuous <Continuous>  dextrose 5%. 1000 milliLiter(s) (100 mL/Hr) IV Continuous <Continuous>  dextrose 5%. 1000 milliLiter(s) (50 mL/Hr) IV Continuous <Continuous>  dextrose 50% Injectable 25 Gram(s) IV Push once  dextrose 50% Injectable 12.5 Gram(s) IV Push once  dextrose 50% Injectable 25 Gram(s) IV Push once  fentaNYL   Infusion. 0.509 MICROgram(s)/kG/Hr (3.5 mL/Hr) IV Continuous <Continuous>  glucagon  Injectable 1 milliGRAM(s) IntraMuscular once  heparin  Infusion 1200 Unit(s)/Hr (12 mL/Hr) IV Continuous <Continuous>  influenza  Vaccine (HIGH DOSE) 0.5 milliLiter(s) IntraMuscular once  insulin lispro (ADMELOG) corrective regimen sliding scale   SubCutaneous every 6 hours  norepinephrine Infusion 0.05 MICROgram(s)/kG/Min (6.56 mL/Hr) IV Continuous <Continuous>    MEDICATIONS  (PRN):  dextrose Oral Gel 15 Gram(s) Oral once PRN Blood Glucose LESS THAN 70 milliGRAM(s)/deciliter        OBJECTIVE:  ICU Vital Signs Last 24 Hrs  T(C): 38.7 (2025 05:00), Max: 38.8 (2025 01:00)  T(F): 101.7 (:00), Max: 101.9 (2025 01:00)  HR: 80 (:00) (67 - 93)  BP: --  BP(mean): --  ABP: 106/37 (2025 05:00) (96/37 - 132/63)  ABP(mean): 65 (2025 05:00) (54 - 95)  RR: 16 (2025 05:00) (11 - 44)  SpO2: 99% (2025 05:00) (97% - 100%)    O2 Parameters below as of 2025 05:00  Patient On (Oxygen Delivery Method): ventilator    O2 Concentration (%): 40      Mode: AC/ CMV (Assist Control/ Continuous Mandatory Ventilation)  RR (machine): 16  TV (machine): 450  FiO2: 40  PEEP: 5  ITime: 0.72  MAP: 9  PIP: 23    I&O's Summary    2025 07:01  -  2025 07:00  --------------------------------------------------------  IN: 620.2 mL / OUT: 273 mL / NET: 347.2 mL    2025 07:01  -  2025 05:42  --------------------------------------------------------  IN: 1526.9 mL / OUT: 1251 mL / NET: 275.9 mL      Daily     Daily Weight in k (2025 04:00)      PHYSICAL EXAMINATION:  General: Comfortable, no acute distress, cooperative with exam.  HEENT: Moist mucous membranes.  Respiratory: CTAB, normal respiratory effort, no coughing, wheezes, crackles, or rales.  CV: RRR, S1S2, no murmurs, rubs or gallops. No JVD. Distal pulses intact.  Abdominal: Soft, nontender, nondistended, no rebound or guarding, normal bowel sounds.  Neurology: AOx3, no focal neuro defects, WALTERS x 4.  Extremities: No pitting edema, + Peripheral pulses.          LABS:  ABG - ( 2025 02:23 )  pH, Arterial: 7.43  pH, Blood: x     /  pCO2: 31    /  pO2: 101   / HCO3: 21    / Base Excess: -2.8  /  SaO2: 98.4                                    12.3   12.06 )-----------( 256      ( 2025 02:23 )             37.4     02-    141  |  110[H]  |  26[H]  ----------------------------<  155[H]  4.1   |  17[L]  |  1.35[H]    Ca    8.0[L]      2025 02:23  Phos  2.5       Mg     2.50         TPro  5.9[L]  /  Alb  3.2[L]  /  TBili  0.8  /  DBili  x   /  AST  200[H]  /  ALT  69[H]  /  AlkPhos  105  25    LIVER FUNCTIONS - ( 2025 02:23 )  Alb: 3.2 g/dL / Pro: 5.9 g/dL / ALK PHOS: 105 U/L / ALT: 69 U/L / AST: 200 U/L / GGT: x           PT/INR - ( 2025 02:23 )   PT: 13.8 sec;   INR: 1.19 ratio         PTT - ( :23 )  PTT:81.2 sec  CKMB Units: 36.6 ng/mL ( @ 02:23)  CKMB Units: 76.4 ng/mL ( @ 20:30)  CKMB Units: 161.8 ng/mL ( @ 08:00)    CARDIAC MARKERS ( 2025 02:23 )  x     / x     / x     / x     / 36.6 ng/mL  CARDIAC MARKERS ( 2025 20:30 )  x     / x     / x     / x     / 76.4 ng/mL  CARDIAC MARKERS ( 2025 08:00 )  x     / x     / x     / x     / 161.8 ng/mL  CARDIAC MARKERS ( 2025 02:28 )  x     / x     / x     / x     / 167.5 ng/mL  CARDIAC MARKERS ( 2025 20:10 )  x     / x     / x     / x     / 93.4 ng/mL      Urinalysis Basic - ( 2025 02:23 )    Color: Dark Yellow / Appearance: Turbid / S.021 / pH: x  Gluc: 155 mg/dL / Ketone: Negative mg/dL  / Bili: Negative / Urobili: 1.0 mg/dL   Blood: x / Protein: 100 mg/dL / Nitrite: Negative   Leuk Esterase: Small / RBC: >50 /HPF / WBC 4 /HPF   Sq Epi: x / Non Sq Epi: 7 /HPF / Bacteria: Negative /HPF                  Assessment and Plan:   · Assessment	    Assessment:  · Assessment	  85M no known PMH brought into [facility] ED post Cardiac Arrest, down 10 minutes before cpr and 1 hour before rosc, intubated and on levophed found to have AWMI brought urgently to Cath Lab with Dr. Greenberg.       PLAN  ======= NEUROLOGY ===========  #Unresponsive post Cardiac Arrest  Intubated and sedated on Ventilator  - Fentanyl for RASS goal -2 to -3  - Propofol gtt for sedation/VT supression-> initial rhythm per EMS was VFib  - CTH: IMPRESSION: No acute intracranial hemorrhage, mass effect, or shift of the midline structures  If diffuse anoxic brain injury remains a persistent clinical concern, recommend a short-term interval follow-up CT examination.  -Neuro rec appreciated:   Myoclonic jerks and other movements common after cardiac arrest - difficult to distinguish between epileptic or nonepileptic  Concern for anoxic encephalopathy indicative of significant brain injury.   neurologic prognosis likely poor based on total downtime at least 20 minutes, myoclonus, and absence of some brainstem reflexes. (current exam could be impacted by sedation)  F/u neuron specific enolase  - EEG       ======= RESPIRATORY ===============  # Intubated post Cardiac Arrest  - AC Rate 450/16/40/5   - Daily CXR       ======= CARDIOVASCULAR ==============    # AWMI  cardiac arrest  - - s/p LHC: severe pLAD stenosis 90%, mLAD 50-60%, LCx okay, anomalous RCA. No PCI  - R fem IABP placed for perfusion;   - RHC: RA 10 mmHg. PA 45/13/26 mmHg. PCW 12 mmHg. Cardiac index 3.03 L/min/m2. SVR 1088 dsc.  - Continue Levophed to maintain MAP > 65mmHg  - Tentative PCI pending neurologic recovery as d/w Dr Greenberg  - Aspirin 81mg daily  - Plavix 75mg daily  - Lipitor 80mg daily    -HFrEF   - Left ventricular systolic function is mildly to moderately decreased with an ejection fraction visually estimated at 45 %. Regional wall motion abnormalities present.   - There is hypokinesis of the apical septal, mid anteroseptal, mid inferoseptal and apical walls.  - CVP Q4 - Lasix 40 IVP for CVP >10      ====== RENAL/  ===============    BLUE  likely hemodynamically mediated in setting of cardiac arrest,   - elevated Scr of 1.32    -Trend BUN/Cr.  -Strict I/O   Hold ACE-I in setting of BLUE . Avoid Nephrotoxic Meds/ Agents   -Adjust Medications according to eGFR      ======= GASTROINTESTINAL =========  #OG Tube  # Glucerna TF up titrating to 40ml/hr  - LFTs normalized  -  lipid profile    LIPIDS  chol m152  triglycerides 59  HDL 56  LDL 84        ======= ENDOCRINE  ==========  - HgbA1C: 6.1   - TSH 1.5      ======== HEMATOLOGIC/ONC =========  #Leukocytosis  - Likely reactive post cardiac arrest/AWMI      ======== INFECTIOUS DISEASE ==========  #MRSA Swab       =============== ====== PPX ===============================  VTE:  Heparin gtt while w/IABP  Stress ulcer: IV PPI qd      ====================DISPOSITION================================  #Maintain in CCU while critically ill  #FULL CODE as d/w patient's sons at bedside    ===================LINES=====================================  RIJ triple lumen /  L radial A-line   PIV     ====================DISPO=======================================  #Ongoing GOC

## 2025-02-26 PROBLEM — Z78.9 OTHER SPECIFIED HEALTH STATUS: Chronic | Status: ACTIVE | Noted: 2024-01-01

## 2025-02-26 NOTE — PROGRESS NOTE ADULT - ASSESSMENT
85M no known PMH brought into [facility] ED post Cardiac Arrest, down 10 minutes before cpr and 1 hour before rosc, intubated and on levophed found to have AWMI brought urgently to Cath Lab with Dr. Greenberg.       PLAN  ======= NEUROLOGY ===========  #Unresponsive post Cardiac Arrest  Intubated and sedated on Ventilator  - Fentanyl for RASS goal -2 to -3  - Propofol gtt for sedation/VT supression-> initial rhythm per EMS was VFib  - CTH: IMPRESSION: No acute intracranial hemorrhage, mass effect, or shift of the midline structures  If diffuse anoxic brain injury remains a persistent clinical concern, recommend a short-term interval follow-up CT examination.  -Neuro rec appreciated:   Myoclonic jerks and other movements common after cardiac arrest - difficult to distinguish between epileptic or nonepileptic  Concern for anoxic encephalopathy indicative of significant brain injury.   neurologic prognosis likely poor based on total downtime at least 20 minutes, myoclonus, and absence of some brainstem reflexes. (current exam could be impacted by sedation)  F/u neuron specific enolase  - EEG       ======= RESPIRATORY ===============  # Intubated post Cardiac Arrest  - AC Rate 450/16/40/5   - Daily CXR       ======= CARDIOVASCULAR ==============    # AWMI  cardiac arrest  - - s/p LHC: severe pLAD stenosis 90%, mLAD 50-60%, LCx okay, anomalous RCA. No PCI  - R fem IABP placed for perfusion;   - RHC: RA 10 mmHg. PA 45/13/26 mmHg. PCW 12 mmHg. Cardiac index 3.03 L/min/m2. SVR 1088 dsc.  - Continue Levophed to maintain MAP > 65mmHg  - Tentative PCI pending neurologic recovery as d/w Dr Greenberg  - Aspirin 81mg daily  - Plavix 75mg daily  - Lipitor 80mg daily    -HFrEF   - Left ventricular systolic function is mildly to moderately decreased with an ejection fraction visually estimated at 45 %. Regional wall motion abnormalities present.   - There is hypokinesis of the apical septal, mid anteroseptal, mid inferoseptal and apical walls.  - CVP Q4 - Lasix 40 IVP for CVP >10      ====== RENAL/  ===============    BLUE  likely hemodynamically mediated in setting of cardiac arrest,   - elevated Scr of 1.32    -Trend BUN/Cr.  -Strict I/O   Hold ACE-I in setting of BLUE . Avoid Nephrotoxic Meds/ Agents   -Adjust Medications according to eGFR      ======= GASTROINTESTINAL =========  #OG Tube  # Glucerna TF up titrating to 40ml/hr  - LFTs normalized  -  lipid profile    LIPIDS  chol m152  triglycerides 59  HDL 56  LDL 84        ======= ENDOCRINE  ==========  - HgbA1C: 6.1   - TSH 1.5      ======== HEMATOLOGIC/ONC =========  #Leukocytosis  - Likely reactive post cardiac arrest/AWMI      ======== INFECTIOUS DISEASE ==========  #MRSA Swab       =============== ====== PPX ===============================  VTE:  Heparin gtt while w/IABP  Stress ulcer: IV PPI qd      ====================DISPOSITION================================  #Maintain in CCU while critically ill  #FULL CODE as d/w patient's sons at bedside    ===================LINES=====================================  RIJ triple lumen 2/24/  L radial A-line 2/23  PIV 2/23    ====================DISPO=======================================  #Ongoing GOC 85M no known PMH brought into [facility] ED post Cardiac Arrest, down 10 minutes before cpr and 1 hour before rosc, intubated and on levophed found to have AWMI brought urgently to Cath Lab with Dr. Greenberg.       PLAN  ======= NEUROLOGY ===========  #Unresponsive post Cardiac Arrest  Intubated and sedated on Ventilator  - Fentanyl for RASS goal -2 to -3  - Propofol gtt for sedation/VT supression-> initial rhythm per EMS was VFib  - CTH: IMPRESSION: No acute intracranial hemorrhage, mass effect, or shift of the midline structures  If diffuse anoxic brain injury remains a persistent clinical concern, recommend a short-term interval follow-up CT examination.  -Neuro rec appreciated:   Myoclonic jerks and other movements common after cardiac arrest - difficult to distinguish between epileptic or nonepileptic  Concern for anoxic encephalopathy indicative of significant brain injury.   neurologic prognosis likely poor based on total downtime at least 20 minutes, myoclonus, and absence of some brainstem reflexes. (current exam could be impacted by sedation)  F/u neuron specific enolase  - EEG       ======= RESPIRATORY ===============  # Intubated post Cardiac Arrest  - AC Rate 450/16/40/5   - Daily CXR       ======= CARDIOVASCULAR ==============    # AWMI  cardiac arrest  - - s/p LHC: severe pLAD stenosis 90%, mLAD 50-60%, LCx okay, anomalous RCA. No PCI  - R fem IABP placed for perfusion;   - RHC: RA 10 mmHg. PA 45/13/26 mmHg. PCW 12 mmHg. Cardiac index 3.03 L/min/m2. SVR 1088 dsc.  - Continue Levophed to maintain MAP > 65mmHg  - Continue Vasopressin 0.06mg/min  - Start phenylephrine gtt  - Tentative PCI pending neurologic recovery as d/w Dr Greenberg  - Aspirin 81mg daily  - Plavix 75mg daily  - D/c Lipitor    -HFrEF   - Left ventricular systolic function is mildly to moderately decreased with an ejection fraction visually estimated at 45 %. Regional wall motion abnormalities present.   - There is hypokinesis of the apical septal, mid anteroseptal, mid inferoseptal and apical walls.  - CVP Q4 - Lasix 40 IVP for CVP >10      ====== RENAL/  ===============    BLUE  likely hemodynamically mediated in setting of cardiac arrest,   - elevated Scr of 3.87  -Trend BUN/Cr.  -Strict I/O   Hold ACE-I in setting of BLUE . Avoid Nephrotoxic Meds/ Agents   -Adjust Medications according to eGFR      ======= GASTROINTESTINAL =========  #OG Tube  # Glucerna TF up titrating to 40ml/hr  - LFTs increasing  - d/c Lipitor    LIPIDS  chol m152  triglycerides 59  HDL 56  LDL 84        ======= ENDOCRINE  ==========  - HgbA1C: 6.1   - TSH 1.5      ======== HEMATOLOGIC/ONC =========  #Leukocytosis  - Likely reactive post cardiac arrest/AWMI      ======== INFECTIOUS DISEASE ==========  #MRSA Swab       =============== ====== PPX ===============================  VTE:  Heparin gtt while w/IABP  Stress ulcer: IV PPI qd      ====================DISPOSITION================================  #Maintain in CCU while critically ill  #FULL CODE as d/w patient's sons at bedside    ===================LINES=====================================  RIJ triple lumen 2/24/  L radial A-line 2/23  PIV 2/23    ====================DISPO=======================================  #Ongoing GOC

## 2025-02-26 NOTE — CHART NOTE - NSCHARTNOTEFT_GEN_A_CORE
~22:30 2/25/25 patient had episode of possible seizure and febrile to rectal temp 39.8C-> patient was subsequently administered Tylenol and Versed 4 mg IVP x1 and rhythmic jerking improved afterwards  ~0145 2/26/25 patient had additional episode of rhythmic jerking that appeared different from his myoclonic jerking. Episode was 20 seconds in duration, no evidence of gaze deviation, no defecation with episode. Patient administered additional Versed 4 mg IVP x1. Called neurology overnight regarding further recommendations for empiric initiation/dosing of Keppra in setting of worsening renal function  - Spoke with on call neurology resident Dr. Agosto and discussed seizure episode, of who recommended reasonable to give Keppra 500 mg IV x1 for seizure suppression.    - Pending formal neurology recommendations regarding future dosing of Keppra  - Continue Fentanyl and Precedex gtts for sedation  - Eventual vEEG  - Eventual repeat CT Head when able  - Neuron specific enolase sent, pending results  - Overall prognosis very poor

## 2025-02-26 NOTE — PROGRESS NOTE ADULT - ASSESSMENT
85 year old gentleman with extensive cardiac disease (CAD) found down after cardiac arrest with total down time of approximately 1 hour. Found to have possible occasional myoclonic jerks, as well as two episodes of whole body shaking. Will obtain neuroprognostication workup while pt is being medically managed in ICU.    Impression: Severe cerebral dysfunction. Comatose patient, per primary team, having possible occasional myoclonic jerks in the setting of cardiac arrest. Myoclonic jerks and other movements are common after cardiac arrest however sometimes difficult to distinguish between epileptic or nonepileptic, however in this case more likely to be nonepileptic. In addition to these episodes, pt also found to have 2 episodes of rhythmic whole body shaking. Ultimate neurologic prognosis for meaningful recovery at this time is unclear. Noted ROSC after around 1 hour.      Recommendations:  -Received Keppra 500mg x 1 early AM 2/26 after second seizure-like episode. Will discuss If standing Keppra needed.  - Patient with possible myoclonus in the setting of cardiac arrest and anoxic encephalopathy indicative of significant brain injury. Unclear neurologic prognosis at this time but likely poor based on total downtime at least 20 minutes, myoclonus, and absence of some brainstem reflexes. However, current exam could be impacted by sedation  [] Draw neuron specific enolase 24 hours s/p cardiac arrest, and 48-72 hours after (<30 better prognosis, 30-60 indeterminate, >60 poor prognosis)  [] vEEG - to evaluate for focal slowing, epileptiform discharges, or seizures (event capture)   [] MRI brain w/o contrast to evaluate for anoxic brain injury (cortical injury between day 2-7 post-cardiac arrest and subcortical injury day 5-10 post-cardiac arrest and brainstem injury day 7-14)  [x] CTH: nonspecific ventriculomegaly likely indicative of age appropriate atrophy   [] Avoid benzodiazepine or propofol for sedation, Precedex, fentanyl, or hydromorphone OK  [] Avoid sedating agents that can significantly affect EEG, such as benzodiazepines and propofol. Dexmedetomidine, fentanyl, and hydromorphone are OK.  [] GOC per primary team    Case to be seen by neuro attending on AM rounds. Recommendations not finalized until after attending attestation.     85 year old gentleman with extensive cardiac disease (CAD) found down after cardiac arrest with total down time of approximately 1 hour. Found to have possible occasional myoclonic jerks, as well as two episodes of whole body shaking. Will obtain neuroprognostication workup while pt is being medically managed in ICU.    Impression: Severe cerebral dysfunction. Comatose patient, per primary team, having possible occasional myoclonic jerks in the setting of cardiac arrest. Myoclonic jerks and other movements are common after cardiac arrest however sometimes difficult to distinguish between epileptic or nonepileptic, however in this case more likely to be nonepileptic. In addition to these episodes, pt also found to have 2 episodes of rhythmic whole body shaking. Ultimate neurologic prognosis for meaningful recovery at this time is unclear. Noted ROSC after around 1 hour.      Recommendations:  -Received Keppra 500mg x 1 early AM 2/26 after second seizure-like episode. Continue Keppra 500 mg qd renally dosed  - Patient with possible myoclonus in the setting of cardiac arrest and anoxic encephalopathy indicative of significant brain injury. Unclear neurologic prognosis at this time but likely poor based on total downtime at least 20 minutes, myoclonus, and absence of some brainstem reflexes. However, current exam could be impacted by sedation  [] Please obtain repeat CT Head non contrast  [] Draw neuron specific enolase 24 hours s/p cardiac arrest, and 48-72 hours after (<30 better prognosis, 30-60 indeterminate, >60 poor prognosis)  [] vEEG - to evaluate for focal slowing, epileptiform discharges, or seizures (event capture)   [] MRI brain w/o contrast to evaluate for anoxic brain injury (cortical injury between day 2-7 post-cardiac arrest and subcortical injury day 5-10 post-cardiac arrest and brainstem injury day 7-14)  [x] CTH: nonspecific ventriculomegaly likely indicative of age appropriate atrophy   [] Avoid benzodiazepine or propofol for sedation, Precedex, fentanyl, or hydromorphone OK  [] Avoid sedating agents that can significantly affect EEG, such as benzodiazepines and propofol. Dexmedetomidine, fentanyl, and hydromorphone are OK.  [] GOC per primary team    Case to be seen by neuro attending on AM rounds. Recommendations not finalized until after attending attestation.

## 2025-02-26 NOTE — CHART NOTE - NSCHARTNOTEFT_GEN_A_CORE
EEG preliminary read (not final) on the initial recording hour(s) = >2 hr    -No epileptiform abnormalities or seizures captured.  -Severe diffuse cerebral dysfunction: background is at least 90-95% diffusely suppressed (<10 uV) with intermittent <1-second bursts of diffuse attenuated (<20-uV) polymorphic delta    Final report to follow tomorrow morning after completion of study.    EEG Reading Room Ph#: [phone]  Epilepsy Answering Service after 5PM and before 8:30AM: Ph#: [phone]

## 2025-02-26 NOTE — PROGRESS NOTE ADULT - SUBJECTIVE AND OBJECTIVE BOX
Neurology Progress Note    SUBJECTIVE/OBJECTIVE/INTERVAL EVENTS:   Neurology resident contacted by CCU overnight because of two episodes concerning for seizure activity. Roughly between 11 PM and Midnight, pt had an episode of whole body shaking, unclear if there was gaze deviation, no head version, no bowel incontinence. Unclear duration. Pt received Versed 4mg x 1 for this episode, and it resolved. Of note, pt was febrile to 38.9 during this episode.    Pt had a second episode of similar semiology at around 1:40 AM on 2/26. Episode lasted 20 seconds and self-resolved. Pt received Keppra 500mg x 1 afterwards. This second episode was video recorded by provider. It is semiologically different from myoclonic jerks previously witnessed.     MEDICATIONS  (STANDING):  aspirin  chewable 81 milliGRAM(s) Oral daily  atorvastatin 80 milliGRAM(s) Oral at bedtime  chlorhexidine 0.12% Liquid 15 milliLiter(s) Oral Mucosa every 12 hours  chlorhexidine 2% Cloths 1 Application(s) Topical daily  clopidogrel Tablet 75 milliGRAM(s) Oral daily  dexMEDEtomidine Infusion 0.5 MICROgram(s)/kG/Hr (8.6 mL/Hr) IV Continuous <Continuous>  dextrose 5%. 1000 milliLiter(s) (50 mL/Hr) IV Continuous <Continuous>  dextrose 5%. 1000 milliLiter(s) (100 mL/Hr) IV Continuous <Continuous>  dextrose 50% Injectable 25 Gram(s) IV Push once  dextrose 50% Injectable 12.5 Gram(s) IV Push once  dextrose 50% Injectable 25 Gram(s) IV Push once  fentaNYL   Infusion. 0.5 MICROgram(s)/kG/Hr (3.44 mL/Hr) IV Continuous <Continuous>  glucagon  Injectable 1 milliGRAM(s) IntraMuscular once  influenza  Vaccine (HIGH DOSE) 0.5 milliLiter(s) IntraMuscular once  insulin lispro (ADMELOG) corrective regimen sliding scale   SubCutaneous every 6 hours  milrinone Infusion 0.25 MICROgram(s)/kG/Min (5.16 mL/Hr) IV Continuous <Continuous>  norepinephrine Infusion 0.05 MICROgram(s)/kG/Min (3.23 mL/Hr) IV Continuous <Continuous>  piperacillin/tazobactam IVPB.. 3.375 Gram(s) IV Intermittent every 8 hours  vancomycin  IVPB 1000 milliGRAM(s) IV Intermittent every 12 hours  vasopressin Infusion 0.06 Unit(s)/Min (9 mL/Hr) IV Continuous <Continuous>    MEDICATIONS  (PRN):  dextrose Oral Gel 15 Gram(s) Oral once PRN Blood Glucose LESS THAN 70 milliGRAM(s)/deciliter      VITALS & EXAMINATION:  Vital Signs Last 24 Hrs  T(C): 39.2 (26 Feb 2025 00:00), Max: 39.6 (25 Feb 2025 20:00)  T(F): 102.6 (26 Feb 2025 00:00), Max: 103.3 (25 Feb 2025 20:00)  HR: 123 (26 Feb 2025 02:00) (57 - 135)  BP: 103/50 (26 Feb 2025 02:00) (57/33 - 137/98)  BP(mean): 67 (26 Feb 2025 02:00) (42 - 109)  RR: 15 (26 Feb 2025 02:00) (1 - 20)  SpO2: 95% (26 Feb 2025 02:00) (92% - 100%)    Parameters below as of 26 Feb 2025 01:00  Patient On (Oxygen Delivery Method): ventilator    O2 Concentration (%): 50    Neuro Exam (obtained while pt on Fentanyl 1.5 mcg/kg/H, Precedex 0.5 mcg/kg/h, as well as Levophed, Milrinone, and Vasopressin):  Mental Status: No response to LT, voice, or noxious stimuli. No verbal output. Not following commands.  Cranial Nerves: Pupils constricted (possibly from Fentanyl) but reactive b/l, no BTT b/l, no gaze deviation, +corneal reflex intact b/l, no clif facial asymmetry noted, gag reflex is not intact  Motor: No withdrawal to nailbed pressure x 4 extremities  Sensory: Motor: No withdrawal to nailbed pressure x 4 extremities  Coordination: PHOEBE due to mental status  Gait: PHOEBE due to mental status    LABORATORY:  CBC                       12.3   12.06 )-----------( 256      ( 25 Feb 2025 02:23 )             37.4     Chem 02-25    136  |  106  |  38[H]  ----------------------------<  272[H]  5.0   |  15[L]  |  3.14[H]    Ca    7.4[L]      25 Feb 2025 23:17  Phos  4.1     02-25  Mg     2.30     02-25    TPro  5.7[L]  /  Alb  2.8[L]  /  TBili  1.6[H]  /  DBili  x   /  AST  2007[H]  /  ALT  2119[H]  /  AlkPhos  95  02-25    LFTs LIVER FUNCTIONS - ( 25 Feb 2025 23:17 )  Alb: 2.8 g/dL / Pro: 5.7 g/dL / ALK PHOS: 95 U/L / ALT: 2119 U/L / AST: 2007 U/L / GGT: x           Coagulopathy PT/INR - ( 25 Feb 2025 02:23 )   PT: 13.8 sec;   INR: 1.19 ratio         PTT - ( 25 Feb 2025 02:23 )  PTT:81.2 sec  Lipid Panel 02-23 Chol 152 LDL -- HDL 56 Trig 59  A1c   Cardiac enzymes CARDIAC MARKERS ( 25 Feb 2025 23:17 )  x     / x     / x     / x     / 8.3 ng/mL  CARDIAC MARKERS ( 25 Feb 2025 02:23 )  x     / x     / x     / x     / 36.6 ng/mL  CARDIAC MARKERS ( 24 Feb 2025 20:30 )  x     / x     / x     / x     / 76.4 ng/mL  CARDIAC MARKERS ( 24 Feb 2025 08:00 )  x     / x     / x     / x     / 161.8 ng/mL      U/A Urinalysis Basic - ( 25 Feb 2025 23:17 )    Color: x / Appearance: x / SG: x / pH: x  Gluc: 272 mg/dL / Ketone: x  / Bili: x / Urobili: x   Blood: x / Protein: x / Nitrite: x   Leuk Esterase: x / RBC: x / WBC x   Sq Epi: x / Non Sq Epi: x / Bacteria: x      CSF  Immunological  Other    STUDIES & IMAGING: (EEG, CT, MR, U/S, TTE/RAFFY): Neurology Progress Note    SUBJECTIVE/OBJECTIVE/INTERVAL EVENTS:   Neurology resident contacted by CCU overnight because of two episodes concerning for seizure activity. Roughly between 11 PM and Midnight, pt had an episode of whole body shaking, unclear if there was gaze deviation, no head version, no bowel incontinence. Unclear duration. Pt received Versed 4mg x 1 for this episode, and it resolved. Of note, pt was febrile to 38.9 during this episode.    Pt had a second episode of similar semiology at around 1:40 AM on 2/26. Episode lasted 20 seconds and self-resolved. Pt received Keppra 500mg x 1 afterwards (pt's most recent creatinine was 3.14, with eGFR 19). This second episode was video recorded by provider. It is semiologically different from myoclonic jerks previously witnessed.     MEDICATIONS  (STANDING):  aspirin  chewable 81 milliGRAM(s) Oral daily  atorvastatin 80 milliGRAM(s) Oral at bedtime  chlorhexidine 0.12% Liquid 15 milliLiter(s) Oral Mucosa every 12 hours  chlorhexidine 2% Cloths 1 Application(s) Topical daily  clopidogrel Tablet 75 milliGRAM(s) Oral daily  dexMEDEtomidine Infusion 0.5 MICROgram(s)/kG/Hr (8.6 mL/Hr) IV Continuous <Continuous>  dextrose 5%. 1000 milliLiter(s) (50 mL/Hr) IV Continuous <Continuous>  dextrose 5%. 1000 milliLiter(s) (100 mL/Hr) IV Continuous <Continuous>  dextrose 50% Injectable 25 Gram(s) IV Push once  dextrose 50% Injectable 12.5 Gram(s) IV Push once  dextrose 50% Injectable 25 Gram(s) IV Push once  fentaNYL   Infusion. 0.5 MICROgram(s)/kG/Hr (3.44 mL/Hr) IV Continuous <Continuous>  glucagon  Injectable 1 milliGRAM(s) IntraMuscular once  influenza  Vaccine (HIGH DOSE) 0.5 milliLiter(s) IntraMuscular once  insulin lispro (ADMELOG) corrective regimen sliding scale   SubCutaneous every 6 hours  milrinone Infusion 0.25 MICROgram(s)/kG/Min (5.16 mL/Hr) IV Continuous <Continuous>  norepinephrine Infusion 0.05 MICROgram(s)/kG/Min (3.23 mL/Hr) IV Continuous <Continuous>  piperacillin/tazobactam IVPB.. 3.375 Gram(s) IV Intermittent every 8 hours  vancomycin  IVPB 1000 milliGRAM(s) IV Intermittent every 12 hours  vasopressin Infusion 0.06 Unit(s)/Min (9 mL/Hr) IV Continuous <Continuous>    MEDICATIONS  (PRN):  dextrose Oral Gel 15 Gram(s) Oral once PRN Blood Glucose LESS THAN 70 milliGRAM(s)/deciliter      VITALS & EXAMINATION:  Vital Signs Last 24 Hrs  T(C): 39.2 (26 Feb 2025 00:00), Max: 39.6 (25 Feb 2025 20:00)  T(F): 102.6 (26 Feb 2025 00:00), Max: 103.3 (25 Feb 2025 20:00)  HR: 123 (26 Feb 2025 02:00) (57 - 135)  BP: 103/50 (26 Feb 2025 02:00) (57/33 - 137/98)  BP(mean): 67 (26 Feb 2025 02:00) (42 - 109)  RR: 15 (26 Feb 2025 02:00) (1 - 20)  SpO2: 95% (26 Feb 2025 02:00) (92% - 100%)    Parameters below as of 26 Feb 2025 01:00  Patient On (Oxygen Delivery Method): ventilator    O2 Concentration (%): 50    Neuro Exam (obtained while pt on Fentanyl 1.5 mcg/kg/H, Precedex 0.5 mcg/kg/h, as well as Levophed, Milrinone, and Vasopressin):  Mental Status: No response to LT, voice, or noxious stimuli. No verbal output. Not following commands.  Cranial Nerves: Pupils constricted (possibly from Fentanyl) but reactive b/l, no BTT b/l, no gaze deviation, +corneal reflex intact b/l, no clif facial asymmetry noted, gag reflex is not intact  Motor: No withdrawal to nailbed pressure x 4 extremities  Sensory: Motor: No withdrawal to nailbed pressure x 4 extremities  Coordination: PHOEBE due to mental status  Gait: PHOEBE due to mental status    LABORATORY:  CBC                       12.3   12.06 )-----------( 256      ( 25 Feb 2025 02:23 )             37.4     Chem 02-25    136  |  106  |  38[H]  ----------------------------<  272[H]  5.0   |  15[L]  |  3.14[H]    Ca    7.4[L]      25 Feb 2025 23:17  Phos  4.1     02-25  Mg     2.30     02-25    TPro  5.7[L]  /  Alb  2.8[L]  /  TBili  1.6[H]  /  DBili  x   /  AST  2007[H]  /  ALT  2119[H]  /  AlkPhos  95  02-25    LFTs LIVER FUNCTIONS - ( 25 Feb 2025 23:17 )  Alb: 2.8 g/dL / Pro: 5.7 g/dL / ALK PHOS: 95 U/L / ALT: 2119 U/L / AST: 2007 U/L / GGT: x           Coagulopathy PT/INR - ( 25 Feb 2025 02:23 )   PT: 13.8 sec;   INR: 1.19 ratio         PTT - ( 25 Feb 2025 02:23 )  PTT:81.2 sec  Lipid Panel 02-23 Chol 152 LDL -- HDL 56 Trig 59  A1c   Cardiac enzymes CARDIAC MARKERS ( 25 Feb 2025 23:17 )  x     / x     / x     / x     / 8.3 ng/mL  CARDIAC MARKERS ( 25 Feb 2025 02:23 )  x     / x     / x     / x     / 36.6 ng/mL  CARDIAC MARKERS ( 24 Feb 2025 20:30 )  x     / x     / x     / x     / 76.4 ng/mL  CARDIAC MARKERS ( 24 Feb 2025 08:00 )  x     / x     / x     / x     / 161.8 ng/mL      U/A Urinalysis Basic - ( 25 Feb 2025 23:17 )    Color: x / Appearance: x / SG: x / pH: x  Gluc: 272 mg/dL / Ketone: x  / Bili: x / Urobili: x   Blood: x / Protein: x / Nitrite: x   Leuk Esterase: x / RBC: x / WBC x   Sq Epi: x / Non Sq Epi: x / Bacteria: x      CSF  Immunological  Other    STUDIES & IMAGING: (EEG, CT, MR, U/S, TTE/RAFFY):

## 2025-02-26 NOTE — PROGRESS NOTE ADULT - CRITICAL CARE ATTENDING COMMENT
84 year old man with HTN who had witnessed arrest at home. EMS [facility] CPR started when they arrived after 10 min. Coded x 1 hour in the field and brought to [facility] emergently. Had severe pLAD disease. RHC showed CI 3/RA 10/PCWP 12/SVR 1000  IABP placed  Overnight- seizure like activity with myoclonic movements  Febrile 102.9  Had AF overnight- given adenosine x 1 and shocked; now on amiodarone  Milrinone dc'd     TTE 2/24/25:  1. Left ventricular systolic function is mildly to moderately decreased with an ejection fraction visually estimated at 45 %. Regional wall motion abnormalities present.   2. There is hypokinesis of the apical septal, mid anteroseptal, mid inferoseptal and apical walls.   3. Normal right ventricular cavity size and normal right ventricular systolic function.   4. Left atrium is normal in size.   5. No significant valvular disease.   6. No pericardial effusion seen.   7. Right pleural effusion noted.   8. The inferior vena cava is normal in size measuring 1.16 cm in diameter, (normal <2.1cm) with normal inspiratory collapse (normal >50%) consistent with normal right atrial pressure (~3, range 0-5mmHg).    CT head:   No acute intracranial hemorrhage, mass effect, or shift of   the midline structures.  Imaging findings for which normal pressure or communicating hydrocephalus   can be considered in the correct clinical scenario.  If diffuse anoxic brain injury remains a persistent clinical concern,   recommend a short-term interval follow-up CT examination.    Meds:    Amio gtt  Levophed gtt  Precedex gtt  Fentanyl gtt  Heparin ggt  ASA 81 mg daily  Plavix 5 mg daily  Atorvastatin 80 mg daily    #Neuro- Neuro consult called- input appreciated  Having myoclonic movement on sedation concern for anoxia  Neuron specific enolase sent  CT head nondiagnostic  may benefit from EEG  #Pulm- Intubated  Continue vent support  #CV- AWMI with out of hospital arrest (initial rhythm VF)  Now with IABP in place  on DAPT/statin  Levophed/Vaso for BP support  Amio started  Heparin resumed  #Renal- No active issues  #ID- now febrile; cx sent  will cover with zosyn vanco and dc if cx come back negative

## 2025-02-26 NOTE — PROGRESS NOTE ADULT - SUBJECTIVE AND OBJECTIVE BOX
CHIEF COMPLAINT::    INTERVAL HISTORY:    REVIEW OF SYSTEMS: all others negative    MEDICATIONS  (STANDING):  aspirin  chewable 81 milliGRAM(s) Oral daily  atorvastatin 80 milliGRAM(s) Oral at bedtime  chlorhexidine 0.12% Liquid 15 milliLiter(s) Oral Mucosa every 12 hours  chlorhexidine 2% Cloths 1 Application(s) Topical daily  clopidogrel Tablet 75 milliGRAM(s) Oral daily  dexMEDEtomidine Infusion 0.5 MICROgram(s)/kG/Hr (8.6 mL/Hr) IV Continuous <Continuous>  dextrose 5%. 1000 milliLiter(s) (100 mL/Hr) IV Continuous <Continuous>  dextrose 5%. 1000 milliLiter(s) (50 mL/Hr) IV Continuous <Continuous>  dextrose 50% Injectable 25 Gram(s) IV Push once  dextrose 50% Injectable 12.5 Gram(s) IV Push once  dextrose 50% Injectable 25 Gram(s) IV Push once  fentaNYL   Infusion. 0.5 MICROgram(s)/kG/Hr (3.44 mL/Hr) IV Continuous <Continuous>  glucagon  Injectable 1 milliGRAM(s) IntraMuscular once  influenza  Vaccine (HIGH DOSE) 0.5 milliLiter(s) IntraMuscular once  insulin lispro (ADMELOG) corrective regimen sliding scale   SubCutaneous every 6 hours  milrinone Infusion 0.25 MICROgram(s)/kG/Min (5.16 mL/Hr) IV Continuous <Continuous>  norepinephrine Infusion 0.05 MICROgram(s)/kG/Min (3.23 mL/Hr) IV Continuous <Continuous>  piperacillin/tazobactam IVPB.. 3.375 Gram(s) IV Intermittent every 8 hours  vancomycin  IVPB 1000 milliGRAM(s) IV Intermittent every 12 hours  vasopressin Infusion 0.06 Unit(s)/Min (9 mL/Hr) IV Continuous <Continuous>    MEDICATIONS  (PRN):  dextrose Oral Gel 15 Gram(s) Oral once PRN Blood Glucose LESS THAN 70 milliGRAM(s)/deciliter      Objective:  Vital Signs Last 24 Hrs  T(C): 37.2 (2025 05:00), Max: 39.6 (2025 20:00)  T(F): 99 (2025 05:00), Max: 103.3 (2025 20:00)  HR: 107 (2025 06:00) (57 - 135)  BP: 112/55 (2025 06:00) (57/33 - 137/98)  BP(mean): 72 (2025 06:00) (42 - 109)  RR: 16 (2025 06:00) (1 - 20)  SpO2: 97% (:00) (92% - 100%)    Parameters below as of 2025 06:00  Patient On (Oxygen Delivery Method): ventilator    O2 Concentration (%): 45  ICU Vital Signs Last 24 Hrs  T(C): 37.2 (2025 05:00), Max: 39.6 (2025 20:00)  T(F): 99 (2025 05:00), Max: 103.3 (2025 20:00)  HR: 107 (2025 06:00) (57 - 135)  BP: 112/55 (2025 06:00) (57/33 - 137/98)  BP(mean): 72 (2025 06:00) (42 - 109)  ABP: 90/43 (2025 06:00) (49/13 - 144/36)  ABP(mean): 59 (2025 06:00) (27 - 89)  RR: 16 (2025 06:00) (1 - 20)  SpO2: 97% (:00) (92% - 100%)    O2 Parameters below as of :00  Patient On (Oxygen Delivery Method): ventilator    O2 Concentration (%): 45      Mode: AC/ CMV (Assist Control/ Continuous Mandatory Ventilation)  RR (machine): 16  TV (machine): 450  FiO2: 50  PEEP: 5  ITime: 0.71  MAP: 9  PIP: 22    Adult Advanced Hemodynamics Last 24 Hrs  CVP(mm Hg): --  CVP(cm H2O): --  CO: --  CI: --  PA: --  PA(mean): --  PCWP: --  SVR: --  SVRI: --  PVR: --  PVRI: --      02-24 @ 07:01  -  02-25 @ 07:00  --------------------------------------------------------  IN: 1569.2 mL / OUT: 1278 mL / NET: 291.2 mL     @ 07:01  -   @ 06:41  --------------------------------------------------------  IN: 2917.6 mL / OUT: 118 mL / NET: 2799.6 mL      Daily Height in cm: 177.8 (2025 07:00)    Daily Weight in k.4 (2025 04:00)    PHYSICAL EXAM:      Constitutional:    Eyes:    ENMT:    Neck:    Breasts:    Back:    Respiratory:    Cardiovascular:    Gastrointestinal:    Genitourinary:    Rectal:    Extremities:    Vascular:    Neurological:    Skin:    Lymph Nodes:    Musculoskeletal:    Psychiatric:        TELEMETRY:     EKG:     CARDIAC CATH:     ECHO:    IMAGIN.5   16.86 )-----------( 226      ( 2025 04:16 )             36.0         137  |  107  |  42[H]  ----------------------------<  269[H]  5.2   |  15[L]  |  3.87[H]    Ca    7.3[L]      2025 04:16  Phos  5.0       Mg     2.40         TPro  5.9[L]  /  Alb  2.9[L]  /  TBili  1.8[H]  /  DBili  x   /  AST  2435[H]  /  ALT  2164[H]  /  AlkPhos  91      LIVER FUNCTIONS - ( 2025 04:16 )  Alb: 2.9 g/dL / Pro: 5.9 g/dL / ALK PHOS: 91 U/L / ALT: 2164 U/L / AST: 2435 U/L / GGT: x           PT/INR - ( 2025 02:23 )   PT: 13.8 sec;   INR: 1.19 ratio         PTT - ( 2025 04:16 )  PTT:32.4 sec  CKMB Units: 7.5 ng/mL ( @ 04:16)  CKMB Units: 8.3 ng/mL ( @ 23:17)    ABG - ( 2025 04:16 )  pH, Arterial: 7.31  pH, Blood: x     /  pCO2: 32    /  pO2: 130   / HCO3: 16    / Base Excess: -9.1  /  SaO2: 99.2              *BLOOD GAS/ARTERIAL/MIXED/VENOUS  *LACTATE  Urinalysis Basic - ( 2025 04:16 )    Color: x / Appearance: x / SG: x / pH: x  Gluc: 269 mg/dL / Ketone: x  / Bili: x / Urobili: x   Blood: x / Protein: x / Nitrite: x   Leuk Esterase: x / RBC: x / WBC x   Sq Epi: x / Non Sq Epi: x / Bacteria: x        HEALTH ISSUES - PROBLEM Dx:        HEALTH ISSUES - R/O PROBLEM Dx:      Zackary Wynne, CCU NP   #   INTERVAL HISTORY: Patient intubated and sedated with episodes of myoclonic jerks received Keppra.  Episode of SVT to 190s with hypotension. Adenosine given showing Aflutter. Amiodarone started    REVIEW OF SYSTEMS: all others negative    MEDICATIONS  (STANDING):  aspirin  chewable 81 milliGRAM(s) Oral daily  atorvastatin 80 milliGRAM(s) Oral at bedtime  chlorhexidine 0.12% Liquid 15 milliLiter(s) Oral Mucosa every 12 hours  chlorhexidine 2% Cloths 1 Application(s) Topical daily  clopidogrel Tablet 75 milliGRAM(s) Oral daily  dexMEDEtomidine Infusion 0.5 MICROgram(s)/kG/Hr (8.6 mL/Hr) IV Continuous <Continuous>  dextrose 5%. 1000 milliLiter(s) (100 mL/Hr) IV Continuous <Continuous>  dextrose 5%. 1000 milliLiter(s) (50 mL/Hr) IV Continuous <Continuous>  dextrose 50% Injectable 25 Gram(s) IV Push once  dextrose 50% Injectable 12.5 Gram(s) IV Push once  dextrose 50% Injectable 25 Gram(s) IV Push once  fentaNYL   Infusion. 0.5 MICROgram(s)/kG/Hr (3.44 mL/Hr) IV Continuous <Continuous>  glucagon  Injectable 1 milliGRAM(s) IntraMuscular once  influenza  Vaccine (HIGH DOSE) 0.5 milliLiter(s) IntraMuscular once  insulin lispro (ADMELOG) corrective regimen sliding scale   SubCutaneous every 6 hours  milrinone Infusion 0.25 MICROgram(s)/kG/Min (5.16 mL/Hr) IV Continuous <Continuous>  norepinephrine Infusion 0.05 MICROgram(s)/kG/Min (3.23 mL/Hr) IV Continuous <Continuous>  piperacillin/tazobactam IVPB.. 3.375 Gram(s) IV Intermittent every 8 hours  vancomycin  IVPB 1000 milliGRAM(s) IV Intermittent every 12 hours  vasopressin Infusion 0.06 Unit(s)/Min (9 mL/Hr) IV Continuous <Continuous>    MEDICATIONS  (PRN):  dextrose Oral Gel 15 Gram(s) Oral once PRN Blood Glucose LESS THAN 70 milliGRAM(s)/deciliter      Objective:  Vital Signs Last 24 Hrs  T(C): 37.2 (2025 05:00), Max: 39.6 (2025 20:00)  T(F): 99 (2025 05:00), Max: 103.3 (2025 20:00)  HR: 107 (:00) (57 - 135)  BP: 112/55 (2025 06:00) (57/33 - 137/98)  BP(mean): 72 (2025 06:00) (42 - 109)  RR: 16 (:) (1 - 20)  SpO2: 97% (:00) (92% - 100%)    Parameters below as of :  Patient On (Oxygen Delivery Method): ventilator    O2 Concentration (%): 45  ICU Vital Signs Last 24 Hrs  T(C): 37.2 (2025 05:00), Max: 39.6 (2025 20:00)  T(F): 99 (2025 05:00), Max: 103.3 (2025 20:00)  HR: 107 (:00) (57 - 135)  BP: 112/55 (2025 06:00) (57/33 - 137/98)  BP(mean): 72 (2025 06:00) (42 - 109)  ABP: 90/43 (:00) (49/13 - 144/36)  ABP(mean): 59 (:00) (27 - 89)  RR: 16 (:) (1 - 20)  SpO2: 97% (:) (92% - 100%)    O2 Parameters below as of :  Patient On (Oxygen Delivery Method): ventilator    O2 Concentration (%): 45      Mode: AC/ CMV (Assist Control/ Continuous Mandatory Ventilation)  RR (machine): 16  TV (machine): 450  FiO2: 50  PEEP: 5  ITime: 0.71  MAP: 9  PIP: 22    Adult Advanced Hemodynamics Last 24 Hrs  CVP(mm Hg): --  CVP(cm H2O): --  CO: --  CI: --  PA: --  PA(mean): --  PCWP: --  SVR: --  SVRI: --  PVR: --  PVRI: --      02-24 @ 07:01  -   @ 07:00  --------------------------------------------------------  IN: 1569.2 mL / OUT: 1278 mL / NET: 291.2 mL     @ 07:01  -   @ 06:41  --------------------------------------------------------  IN: 2917.6 mL / OUT: 118 mL / NET: 2799.6 mL      Daily Height in cm: 177.8 (2025 07:00)    Daily Weight in k.4 (2025 04:00)      T(C): 37.2 (25 @ 05:00), Max: 39.6 (25 @ 20:00)  HR: 162 (25 @ 08:27) (57 - 162)  BP: 126/68 (25 @ 07:00) (57/33 - 137/98)  RR: 9 (25 @ 08:27) (1 - 20)  SpO2: 94% (25 @ 08:27) (92% - 100%)      PHYSICAL EXAMINATION:  General: Comfortable, no acute distress, cooperative with exam.  HEENT: Moist mucous membranes.  Respiratory: CTAB, normal respiratory effort, no coughing, wheezes, crackles, or rales.  CV: RRR, S1S2, no murmurs, rubs or gallops. No JVD. Distal pulses intact.  Abdominal: Soft, nontender, nondistended, no rebound or guarding, normal bowel sounds.  Neurology: AOx3, no focal neuro defects, WALTERS x 4.  Extremities: No pitting edema, + Peripheral pulses.        TELEMETRY: Afib     EKG:     CARDIAC CATH:     ECHO:  < from: TTE Limited W or WO Ultrasound Enhancing Agent (02.25.25 @ 13:34) >  TRANSTHORACIC ECHOCARDIOGRAM REPORT  ________________________________________________________________________________                                      _______       Pt. Name:       ALLISON BOJORQUEZ Study Date:    2025  MRN:            [id number]        YOB: 1940  Accession #:    [id number]        Age:           84 years  Account#:       [id number]         Gender:        M  Heart Rate:                      Height:        70.00 in (177.80 cm)  Rhythm:                          Weight:        152.00 lb (68.95 kg)  Blood Pressure: 105/35 mmHg      BSA/BMI:       1.86 m² / 21.81 kg/m²  ________________________________________________________________________________________  Referring Physician:    Lyle Greenberg MD  Interpreting Physician: Martha Colby MD  Primary Sonographer:    [facility]ren R. Finkelstein [facility]    CPT:                ECHO TTE W CON FU LTD - .m;DEFINITY ECHO CONTRAST PER                      ML - .m  Indication(s):      Other pericardial effusion [noninflammatory] - I31.39  Procedure:          Limited transthoracic echocardiogram.  Ordering Location:  [facility]  Admission Status:   Inpatient  Contrast Injection: Verbal consent was obtained for injection of Ultrasonic                      Enhancing Agent following a discussion of risks and                      benefits.                      Endocardial visualization enhanced with 2 ml of Definity                      Ultrasound enhancing agent (Lot#:[id number] Exp.Date:                      Discarded Dose:8ml).  UEA Reaction:       Patient had no adverse reaction after injection of                      Ultrasound Enhancing Agent.  Study Information:  Image quality for this study is fair.    _______________________________________________________________________________________     CONCLUSIONS:      1. Administration of echocontrast shows no clear evidence of perforation.   2. Small pericardial effusion noted adjacent to the apex, small pericardial effusion noted adjacent to the lateral leftventricle, moderate pericardial effusion noted adjacent to the right ventricle and small pericardial effusion noted adjacent to the anterior right ventricle with no echocardiographic evidence of tamponade physiology.    ________________________________________________________________________________________  FINDINGS:     Left Ventricle:  After obtaining consent, Definity ultrasound enhancing agent was given for enhanced left ventricular opacification and improved delineation of the left ventricular endocardial borders.     Pericardium:  The pericardium is thickened. Organized fibrinous vs coagulant material is seen in the pericardial space. There is a small pericardial effusion noted adjacent to the apex, a small pericardial effusion noted adjacent to the lateral left ventricle, a moderate pericardial effusion noted adjacent to the right ventricle and a small pericardial effusion noted adjacent to the anterior right ventricle with no echocardiographic evidence of tamponade physiology.  ________________________________________________________________________________________  Electronically signed on 2025 at 6:05:27 PM by Martha Colby MD         *** Final ***    < end of copied text >      IMAGIN.5   16.86 )-----------( 226      ( 2025 04:16 )             36.0     02-26    137  |  107  |  42[H]  ----------------------------<  269[H]  5.2   |  15[L]  |  3.87[H]    Ca    7.3[L]      2025 04:16  Phos  5.0       Mg     2.40         TPro  5.9[L]  /  Alb  2.9[L]  /  TBili  1.8[H]  /  DBili  x   /  AST  2435[H]  /  ALT  2164[H]  /  AlkPhos  91      LIVER FUNCTIONS - ( 2025 04:16 )  Alb: 2.9 g/dL / Pro: 5.9 g/dL / ALK PHOS: 91 U/L / ALT: 2164 U/L / AST: 2435 U/L / GGT: x           PT/INR - ( 2025 02:23 )   PT: 13.8 sec;   INR: 1.19 ratio         PTT - ( 2025 04:16 )  PTT:32.4 sec  CKMB Units: 7.5 ng/mL ( @ 04:16)  CKMB Units: 8.3 ng/mL ( @ 23:17)    ABG - ( 2025 04:16 )  pH, Arterial: 7.31  pH, Blood: x     /  pCO2: 32    /  pO2: 130   / HCO3: 16    / Base Excess: -9.1  /  SaO2: 99.2              *BLOOD GAS/ARTERIAL/MIXED/VENOUS  *LACTATE  Urinalysis Basic - ( 2025 04:16 )    Color: x / Appearance: x / SG: x / pH: x  Gluc: 269 mg/dL / Ketone: x  / Bili: x / Urobili: x   Blood: x / Protein: x / Nitrite: x   Leuk Esterase: x / RBC: x / WBC x   Sq Epi: x / Non Sq Epi: x / Bacteria: x        HEALTH ISSUES - PROBLEM Dx:        HEALTH ISSUES - R/O PROBLEM Dx:      TANESHA BrarU NP   #[id number]   INTERVAL HISTORY: Patient intubated and sedated with episodes of myoclonic jerks received Keppra.  Episode of SVT to 190s with hypotension. Adenosine given showing Aflutter. Amiodarone started    REVIEW OF SYSTEMS: all others negative    MEDICATIONS  (STANDING):  aspirin  chewable 81 milliGRAM(s) Oral daily  atorvastatin 80 milliGRAM(s) Oral at bedtime  chlorhexidine 0.12% Liquid 15 milliLiter(s) Oral Mucosa every 12 hours  chlorhexidine 2% Cloths 1 Application(s) Topical daily  clopidogrel Tablet 75 milliGRAM(s) Oral daily  dexMEDEtomidine Infusion 0.5 MICROgram(s)/kG/Hr (8.6 mL/Hr) IV Continuous <Continuous>  dextrose 5%. 1000 milliLiter(s) (100 mL/Hr) IV Continuous <Continuous>  dextrose 5%. 1000 milliLiter(s) (50 mL/Hr) IV Continuous <Continuous>  dextrose 50% Injectable 25 Gram(s) IV Push once  dextrose 50% Injectable 12.5 Gram(s) IV Push once  dextrose 50% Injectable 25 Gram(s) IV Push once  fentaNYL   Infusion. 0.5 MICROgram(s)/kG/Hr (3.44 mL/Hr) IV Continuous <Continuous>  glucagon  Injectable 1 milliGRAM(s) IntraMuscular once  influenza  Vaccine (HIGH DOSE) 0.5 milliLiter(s) IntraMuscular once  insulin lispro (ADMELOG) corrective regimen sliding scale   SubCutaneous every 6 hours  milrinone Infusion 0.25 MICROgram(s)/kG/Min (5.16 mL/Hr) IV Continuous <Continuous>  norepinephrine Infusion 0.05 MICROgram(s)/kG/Min (3.23 mL/Hr) IV Continuous <Continuous>  piperacillin/tazobactam IVPB.. 3.375 Gram(s) IV Intermittent every 8 hours  vancomycin  IVPB 1000 milliGRAM(s) IV Intermittent every 12 hours  vasopressin Infusion 0.06 Unit(s)/Min (9 mL/Hr) IV Continuous <Continuous>    MEDICATIONS  (PRN):  dextrose Oral Gel 15 Gram(s) Oral once PRN Blood Glucose LESS THAN 70 milliGRAM(s)/deciliter      Objective:  Vital Signs Last 24 Hrs  T(C): 37.2 (2025 05:00), Max: 39.6 (2025 20:00)  T(F): 99 (2025 05:00), Max: 103.3 (2025 20:00)  HR: 107 (:00) (57 - 135)  BP: 112/55 (2025 06:00) (57/33 - 137/98)  BP(mean): 72 (2025 06:00) (42 - 109)  RR: 16 (:) (1 - 20)  SpO2: 97% (:00) (92% - 100%)    Parameters below as of :  Patient On (Oxygen Delivery Method): ventilator    O2 Concentration (%): 45  ICU Vital Signs Last 24 Hrs  T(C): 37.2 (2025 05:00), Max: 39.6 (2025 20:00)  T(F): 99 (2025 05:00), Max: 103.3 (2025 20:00)  HR: 107 (:00) (57 - 135)  BP: 112/55 (2025 06:00) (57/33 - 137/98)  BP(mean): 72 (2025 06:00) (42 - 109)  ABP: 90/43 (:00) (49/13 - 144/36)  ABP(mean): 59 (:00) (27 - 89)  RR: 16 (:) (1 - 20)  SpO2: 97% (:) (92% - 100%)    O2 Parameters below as of :  Patient On (Oxygen Delivery Method): ventilator    O2 Concentration (%): 45      Mode: AC/ CMV (Assist Control/ Continuous Mandatory Ventilation)  RR (machine): 16  TV (machine): 450  FiO2: 50  PEEP: 5  ITime: 0.71  MAP: 9  PIP: 22    Adult Advanced Hemodynamics Last 24 Hrs  CVP(mm Hg): --  CVP(cm H2O): --  CO: --  CI: --  PA: --  PA(mean): --  PCWP: --  SVR: --  SVRI: --  PVR: --  PVRI: --      02-24 @ 07:01  -   @ 07:00  --------------------------------------------------------  IN: 1569.2 mL / OUT: 1278 mL / NET: 291.2 mL     @ 07:01  -   @ 06:41  --------------------------------------------------------  IN: 2917.6 mL / OUT: 118 mL / NET: 2799.6 mL      Daily Height in cm: 177.8 (2025 07:00)    Daily Weight in k.4 (2025 04:00)      T(C): 37.2 (25 @ 05:00), Max: 39.6 (25 @ 20:00)  HR: 162 (25 @ 08:27) (57 - 162)  BP: 126/68 (25 @ 07:00) (57/33 - 137/98)  RR: 9 (25 @ 08:27) (1 - 20)  SpO2: 94% (25 @ 08:27) (92% - 100%)      PHYSICAL EXAMINATION:  General: Intubated and sedated  HEENT: Dry mucous membranes.  Respiratory: Intubated and sedated on fiO2 45%   CV: AFlutter on Vaso and Levo and IABP  Abdominal: OG tube distended  Neurology: Intubated and sedated  Extremities: +2 edema        TELEMETRY: Afib     EKG:     CARDIAC CATH:     ECHO:  < from: TTE Limited W or WO Ultrasound Enhancing Agent (25 @ 13:34) >  TRANSTHORACIC ECHOCARDIOGRAM REPORT  ________________________________________________________________________________                                      _______       Pt. Name:       ALLISON BOJORQUEZ Study Date:    2025  MRN:            [id number]        YOB: 1940  Accession #:    [id number]        Age:           84 years  Account#:       [id number]         Gender:        M  Heart Rate:                      Height:        70.00 in (177.80 cm)  Rhythm:                          Weight:        152.00 lb (68.95 kg)  Blood Pressure: 105/35 mmHg      BSA/BMI:       1.86 m² / 21.81 kg/m²  ________________________________________________________________________________________  Referring Physician:    Lyle Greenberg MD  Interpreting Physician: Martha Colby MD  Primary Sonographer:    [facility]ren R. Finkelstein [facility]    CPT:                ECHO TTE W CON FU LTD - .m;DEFINITY ECHO CONTRAST PER                      ML - .m  Indication(s):      Other pericardial effusion [noninflammatory] - I31.39  Procedure:          Limited transthoracic echocardiogram.  Ordering Location:  [facility]  Admission Status:   Inpatient  Contrast Injection: Verbal consent was obtained for injection of Ultrasonic                      Enhancing Agent following a discussion of risks and                      benefits.                      Endocardial visualization enhanced with 2 ml of Definity                      Ultrasound enhancing agent (Lot#:[id number] Exp.Date:                      Discarded Dose:8ml).  UEA Reaction:       Patient had no adverse reaction after injection of                      Ultrasound Enhancing Agent.  Study Information:  Image quality for this study is fair.    _______________________________________________________________________________________     CONCLUSIONS:      1. Administration of echocontrast shows no clear evidence of perforation.   2. Small pericardial effusion noted adjacent to the apex, small pericardial effusion noted adjacent to the lateral leftventricle, moderate pericardial effusion noted adjacent to the right ventricle and small pericardial effusion noted adjacent to the anterior right ventricle with no echocardiographic evidence of tamponade physiology.    ________________________________________________________________________________________  FINDINGS:     Left Ventricle:  After obtaining consent, Definity ultrasound enhancing agent was given for enhanced left ventricular opacification and improved delineation of the left ventricular endocardial borders.     Pericardium:  The pericardium is thickened. Organized fibrinous vs coagulant material is seen in the pericardial space. There is a small pericardial effusion noted adjacent to the apex, a small pericardial effusion noted adjacent to the lateral left ventricle, a moderate pericardial effusion noted adjacent to the right ventricle and a small pericardial effusion noted adjacent to the anterior right ventricle with no echocardiographic evidence of tamponade physiology.  ________________________________________________________________________________________  Electronically signed on 2025 at 6:05:27 PM by Martha Colby MD         *** Final ***    < end of copied text >      IMAGIN.5   16.86 )-----------( 226      ( 2025 04:16 )             36.0         137  |  107  |  42[H]  ----------------------------<  269[H]  5.2   |  15[L]  |  3.87[H]    Ca    7.3[L]      2025 04:16  Phos  5.0       Mg     2.40         TPro  5.9[L]  /  Alb  2.9[L]  /  TBili  1.8[H]  /  DBili  x   /  AST  2435[H]  /  ALT  2164[H]  /  AlkPhos  91      LIVER FUNCTIONS - ( 2025 04:16 )  Alb: 2.9 g/dL / Pro: 5.9 g/dL / ALK PHOS: 91 U/L / ALT: 2164 U/L / AST: 2435 U/L / GGT: x           PT/INR - ( 2025 02:23 )   PT: 13.8 sec;   INR: 1.19 ratio         PTT - ( 2025 04:16 )  PTT:32.4 sec  CKMB Units: 7.5 ng/mL ( @ 04:16)  CKMB Units: 8.3 ng/mL ( @ 23:17)    ABG - ( 2025 04:16 )  pH, Arterial: 7.31  pH, Blood: x     /  pCO2: 32    /  pO2: 130   / HCO3: 16    / Base Excess: -9.1  /  SaO2: 99.2              *BLOOD GAS/ARTERIAL/MIXED/VENOUS  *LACTATE  Urinalysis Basic - ( 2025 04:16 )    Color: x / Appearance: x / SG: x / pH: x  Gluc: 269 mg/dL / Ketone: x  / Bili: x / Urobili: x   Blood: x / Protein: x / Nitrite: x   Leuk Esterase: x / RBC: x / WBC x   Sq Epi: x / Non Sq Epi: x / Bacteria: x        HEALTH ISSUES - PROBLEM Dx:        HEALTH ISSUES - R/O PROBLEM Dx:      Zackary Wynne, TANESHAU NP   #[id number]   INTERVAL HISTORY: Patient intubated and sedated with episodes of myoclonic jerks received Keppra.  Episode of SVT to 190s with hypotension. Adenosine given showing Aflutter. Amiodarone started    REVIEW OF SYSTEMS: all others negative    MEDICATIONS  (STANDING):  aspirin  chewable 81 milliGRAM(s) Oral daily  atorvastatin 80 milliGRAM(s) Oral at bedtime  chlorhexidine 0.12% Liquid 15 milliLiter(s) Oral Mucosa every 12 hours  chlorhexidine 2% Cloths 1 Application(s) Topical daily  clopidogrel Tablet 75 milliGRAM(s) Oral daily  dexMEDEtomidine Infusion 0.5 MICROgram(s)/kG/Hr (8.6 mL/Hr) IV Continuous <Continuous>  dextrose 5%. 1000 milliLiter(s) (100 mL/Hr) IV Continuous <Continuous>  dextrose 5%. 1000 milliLiter(s) (50 mL/Hr) IV Continuous <Continuous>  dextrose 50% Injectable 25 Gram(s) IV Push once  dextrose 50% Injectable 12.5 Gram(s) IV Push once  dextrose 50% Injectable 25 Gram(s) IV Push once  fentaNYL   Infusion. 0.5 MICROgram(s)/kG/Hr (3.44 mL/Hr) IV Continuous <Continuous>  glucagon  Injectable 1 milliGRAM(s) IntraMuscular once  influenza  Vaccine (HIGH DOSE) 0.5 milliLiter(s) IntraMuscular once  insulin lispro (ADMELOG) corrective regimen sliding scale   SubCutaneous every 6 hours  milrinone Infusion 0.25 MICROgram(s)/kG/Min (5.16 mL/Hr) IV Continuous <Continuous>  norepinephrine Infusion 0.05 MICROgram(s)/kG/Min (3.23 mL/Hr) IV Continuous <Continuous>  piperacillin/tazobactam IVPB.. 3.375 Gram(s) IV Intermittent every 8 hours  vancomycin  IVPB 1000 milliGRAM(s) IV Intermittent every 12 hours  vasopressin Infusion 0.06 Unit(s)/Min (9 mL/Hr) IV Continuous <Continuous>    MEDICATIONS  (PRN):  dextrose Oral Gel 15 Gram(s) Oral once PRN Blood Glucose LESS THAN 70 milliGRAM(s)/deciliter      Objective:  Vital Signs Last 24 Hrs  T(C): 37.2 (2025 05:00), Max: 39.6 (2025 20:00)  T(F): 99 (2025 05:00), Max: 103.3 (2025 20:00)  HR: 107 (:00) (57 - 135)  BP: 112/55 (2025 06:00) (57/33 - 137/98)  BP(mean): 72 (2025 06:00) (42 - 109)  RR: 16 (:) (1 - 20)  SpO2: 97% (:00) (92% - 100%)    Parameters below as of :  Patient On (Oxygen Delivery Method): ventilator    O2 Concentration (%): 45  ICU Vital Signs Last 24 Hrs  T(C): 37.2 (2025 05:00), Max: 39.6 (2025 20:00)  T(F): 99 (2025 05:00), Max: 103.3 (2025 20:00)  HR: 107 (:00) (57 - 135)  BP: 112/55 (2025 06:00) (57/33 - 137/98)  BP(mean): 72 (2025 06:00) (42 - 109)  ABP: 90/43 (:00) (49/13 - 144/36)  ABP(mean): 59 (:00) (27 - 89)  RR: 16 (:) (1 - 20)  SpO2: 97% (:) (92% - 100%)    O2 Parameters below as of :  Patient On (Oxygen Delivery Method): ventilator    O2 Concentration (%): 45      Mode: AC/ CMV (Assist Control/ Continuous Mandatory Ventilation)  RR (machine): 16  TV (machine): 450  FiO2: 50  PEEP: 5  ITime: 0.71  MAP: 9  PIP: 22    Adult Advanced Hemodynamics Last 24 Hrs  CVP(mm Hg): --  CVP(cm H2O): --  CO: --  CI: --  PA: --  PA(mean): --  PCWP: --  SVR: --  SVRI: --  PVR: --  PVRI: --      02-24 @ 07:01  -   @ 07:00  --------------------------------------------------------  IN: 1569.2 mL / OUT: 1278 mL / NET: 291.2 mL     @ 07:01  -   @ 06:41  --------------------------------------------------------  IN: 2917.6 mL / OUT: 118 mL / NET: 2799.6 mL      Daily Height in cm: 177.8 (2025 07:00)    Daily Weight in k.4 (2025 04:00)      T(C): 37.2 (25 @ 05:00), Max: 39.6 (25 @ 20:00)  HR: 162 (25 @ 08:27) (57 - 162)  BP: 126/68 (25 @ 07:00) (57/33 - 137/98)  RR: 9 (25 @ 08:27) (1 - 20)  SpO2: 94% (25 @ 08:27) (92% - 100%)      PHYSICAL EXAMINATION:  General: Intubated and sedated  HEENT: Dry mucous membranes.  Respiratory: Intubated and sedated on fiO2 45%   CV: AFlutter on Vaso and Levo and IABP  Abdominal: OG tube distended  Neurology: Intubated and sedated  Extremities: +2 edema        TELEMETRY: Afib     EKG:     CARDIAC CATH:   < from: Cardiac Catheterization (25 @ 18:15) >      Study Date:     2025   Name:           ALLISON BOJORQUEZ   :            1940   (84 years)   Gender:         male   MR#:            [id number]   [facility]#:           [id number]   Patient Class:  Inpatient     Cath Lab Report    Diagnostic Cardiologist:       Lyle Greenberg MD   Fellow:                        Jerson Zayas, Fellow   Referring Physician:           Marcelino Merida MD     Procedures Performed   Procedures:               1.    IABP     2.    RHC   3.    Venous Access - Right Femoral   4.    Ultrasound Guided Access   5.    Arterial Access - Right Femoral   6.    Diagnostic Coronary Angiography   7.    Left Heart Cath     Indications:                Myocardial infarction with ST elevation  (STEMI)  Out of hospital cardiac arrest     Diagnostic Conclusions:   Severe proximal LAD stenosis (culprit) with MONROE 3 flow. Moderate mid  LAD stenosis with MONROE 3 flow.    RA 10 mmHg. PA 45/13/26 mmHg. PCW 12 mmHg. Cardiac index 3.03  L/min/m2. SVR 1088 dsc.    Recommendations:   Continue dual antiplatelet therapy. Continue IABP for coronary  perfusion. Post-cath ECG with resolution of anterior ST  elevations. Optimize medical therapy for ischemic heart disease.  Monitor BUN/creatinine closely. Consider proximal LAD PCI  pending neurologic recovery.     Presentation:   85 yo male with unknown PMH presents after developing chest pain at  home around 2:30 pm and  subsequently arrested. Patient unconscious for about 10 min prior to  EMS arrival. EMS intubated patient and  performed CPR/ACLS for approximately an hour with intermittent ROSC  prior to arrival at [facility] ECG at [facility]  revealed RBBB with RENA in anterior leads. Levophed initiated for  hypotension. Patient without spontaneous  movements. Cath lab activated to further assess cardiovascular  condition. Patient presents to cath lab  intubated on norepinephrine and fentanyl drips. Consent obtained from  son.    Procedure Narrative:   The risks and alternatives of the procedures and conscious sedation  were explained to the patient and informed consent was  obtained. The patient was brought to the cath lab and placed on the  exam table.  Access   Right femoral artery:   The puncture site was infiltrated with 2% Lidocaine. Vascular access  was obtained using Vascular Ultrasound and a 6fr Sheath    Patient: ALLISON BOJORQUEZ          MRN: [id number]  Study Date: 2025   06:15 PM      Page 1 of 6          [place] was advanced into the vessel. The sheath was exchanged to a  8fr Sheath [place] sheath.  Right femoral vein:   The puncture site was infiltrated with 2% Lidocaine. Vascular access  was obtained using Vascular Ultrasound and a 7fr Sheath  [place] was advanced into the vessel.      Coronary Angiography   Left Coronary System:   A 5 Fr. DxTerity JL 4.0 was positioned into the vessel ostium under  fluoroscopic guidance. Contrast injections were performed  using hand injection.    Right Coronary System:   A 5 Fr. DxTerity JR 4.0 was positioned into the vessel ostium under  fluoroscopic guidance. Contrast injections were performed  using hand injection.    Left Heart Cath   A 5fr Pigtail Angled 145 Expo was successfully advanced across the  aortic valve, placed in the left ventricle and pressures were  recorded.    Right Heart Cath   A 7 Fr.[place] Tho catheter was advanced to the pulmonary artery wedge  position. Measurements of pressures and cardiac  output by assumed alexandrea were obtained.      Intra-aortic Balloon Pump   An intra-aortic balloon pump was inserted via the right femoral artery  using a Sensation Plus 40CC Fiber Optic. IABP placed for  coronary perfusion.      Diagnostic Findings:     Coronary Angiography   The coronary circulation is right dominant. Cardiac catheterization  was performed emergently.    LM   Left main artery: Normal.      LAD   Proximal left anterior descending: There is a 90 % stenosis in the  proximal third portion of the segment. MONROE Flow 3.  Mid left anterior descending: There is a 60 % stenosis in the middle  third portion of the segment. MONROE Flow 3.    CX   First obtuse marginal: Angiography shows minor irregularities.    Second obtuse marginal: There is a 40 % stenosis in the proximal third  portion of the segment.    RCA   Right coronary artery: The vessel was poorly visualized. Angiography  shows minor irregularities. 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      Left Heart Cath   Left ventricular function was assessed. The apical segment is  hypokinetic. Global left ventricular function is mildly depressed.  Ejection fraction was visually estimated with a value of 45%.      < end of copied text >    ECHO:  < from: TTE Limited W or WO Ultrasound Enhancing Agent (25 @ 13:34) >  TRANSTHORACIC ECHOCARDIOGRAM REPORT  ________________________________________________________________________________                                      _______       Pt. Name:       ALLISON BOJORQUEZ Study Date:    2025  MRN:            [id number]        YOB: 1940  Accession #:    [id number]        Age:           84 years  Account#:       [id number]         Gender:        M  Heart Rate:                      Height:        70.00 in (177.80 cm)  Rhythm:                          Weight:        152.00 lb (68.95 kg)  Blood Pressure: 105/35 mmHg      BSA/BMI:       1.86 m² / 21.81 kg/m²  ________________________________________________________________________________________  Referring Physician:    Lyle Greenberg MD  Interpreting Physician: Martha Colby MD  Primary Sonographer:    Lauren R. Finkelstein [facility]    CPT:                ECHO TTE W CON FU LTD - .m;DEFINITY ECHO CONTRAST PER                      ML - .m  Indication(s):      Other pericardial effusion [noninflammatory] - I31.39  Procedure:          Limited transthoracic echocardiogram.  Ordering Location:  [facility]  Admission Status:   Inpatient  Contrast Injection: Verbal consent was obtained for injection of Ultrasonic                      Enhancing Agent following a discussion of risks and                      benefits.                      Endocardial visualization enhanced with 2 ml of Definity                      Ultrasound enhancing agent (Lot#:[id number] Exp.Date:                      Discarded Dose:8ml).  UEA Reaction:       Patient had no adverse reaction after injection of                      Ultrasound Enhancing Agent.  Study Information:  Image quality for this study is fair.    _______________________________________________________________________________________     CONCLUSIONS:      1. Administration of echocontrast shows no clear evidence of perforation.   2. Small pericardial effusion noted adjacent to the apex, small pericardial effusion noted adjacent to the lateral leftventricle, moderate pericardial effusion noted adjacent to the right ventricle and small pericardial effusion noted adjacent to the anterior right ventricle with no echocardiographic evidence of tamponade physiology.    ________________________________________________________________________________________  FINDINGS:     Left Ventricle:  After obtaining consent, Definity ultrasound enhancing agent was given for enhanced left ventricular opacification and improved delineation of the left ventricular endocardial borders.     Pericardium:  The pericardium is thickened. Organized fibrinous vs coagulant material is seen in the pericardial space. There is a small pericardial effusion noted adjacent to the apex, a small pericardial effusion noted adjacent to the lateral left ventricle, a moderate pericardial effusion noted adjacent to the right ventricle and a small pericardial effusion noted adjacent to the anterior right ventricle with no echocardiographic evidence of tamponade physiology.  ________________________________________________________________________________________  Electronically signed on 2025 at 6:05:27 PM by Martha Colby MD         *** Final ***    < end of copied text >      IMAGIN.5   16.86 )-----------( 226      ( 2025 04:16 )             36.0         137  |  107  |  42[H]  ----------------------------<  269[H]  5.2   |  15[L]  |  3.87[H]    Ca    7.3[L]      2025 04:16  Phos  5.0       Mg     2.40         TPro  5.9[L]  /  Alb  2.9[L]  /  TBili  1.8[H]  /  DBili  x   /  AST  2435[H]  /  ALT  2164[H]  /  AlkPhos  91      LIVER FUNCTIONS - ( 2025 04:16 )  Alb: 2.9 g/dL / Pro: 5.9 g/dL / ALK PHOS: 91 U/L / ALT: 2164 U/L / AST: 2435 U/L / GGT: x           PT/INR - ( 2025 02:23 )   PT: 13.8 sec;   INR: 1.19 ratio         PTT - ( 2025 04:16 )  PTT:32.4 sec  CKMB Units: 7.5 ng/mL ( @ 04:16)  CKMB Units: 8.3 ng/mL ( @ 23:17)    ABG - ( 2025 04:16 )  pH, Arterial: 7.31  pH, Blood: x     /  pCO2: 32    /  pO2: 130   / HCO3: 16    / Base Excess: -9.1  /  SaO2: 99.2              *BLOOD GAS/ARTERIAL/MIXED/VENOUS  *LACTATE  Urinalysis Basic - ( 2025 04:16 )    Color: x / Appearance: x / SG: x / pH: x  Gluc: 269 mg/dL / Ketone: x  / Bili: x / Urobili: x   Blood: x / Protein: x / Nitrite: x   Leuk Esterase: x / RBC: x / WBC x   Sq Epi: x / Non Sq Epi: x / Bacteria: x        HEALTH ISSUES - PROBLEM Dx:        HEALTH ISSUES - R/O PROBLEM Dx:      Zackary Wynne, CCU NP   #[phone]

## 2025-02-26 NOTE — PROGRESS NOTE ADULT - ATTENDING COMMENTS
83 y/o M s/p cardiac arrest and anoxic brain injury.   Has 2 episodes of possible seizures. started on Keppra. Not waking up  Poor prognosis   Plan:   CTH stat to r/o brain edema  EEG  Cont Keppra  will follow

## 2025-02-27 NOTE — EEG REPORT - NS EEG TEXT BOX
[facility]   COMPREHENSIVE EPILEPSY CENTER   REPORT OF LONG-TERM VIDEO EEG     [facility]: [address] [place] [address], Ph#: [phone]  [facility]: [address] [place] [address], Ph#: [phone]  [facility]: [address] [place] [address], Ph#: [phone]    Patient Name: ALLISON BOJORQUEZ  Age and : 84y (39)  MRN #: [id number]  Location: [place] [address]  Referring Physician: Lyle Greenberg    Start Time/Date: 10:42 on 2025  End Time/Date: 11:04 on 2025  Duration: 24 hours 22 minutes    _____________________________________________________________  STUDY INFORMATION    EEG Recording Technique:  The patient underwent continuous Video-EEG monitoring, using Telemetry System hardware on the XLTek Digital System. EEG and video data were stored on a computer hard drive with important events saved in digital archive files. The material was reviewed by a physician (electroencephalographer / epileptologist) on a daily basis. Shad and seizure detection algorithms were utilized and reviewed. An EEG Technician attended to the patient, and was available throughout daytime work hours.  The epilepsy center neurologist was available in person or on call 24-hours per day.    EEG Placement and Labeling of Electrodes:  The EEG was performed utilizing 20 channel referential EEG connections (coronal over temporal over parasagittal montage) using all standard 10-20 electrode placements with EKG, with additional electrodes placed in the inferior temporal region using the modified 10-10 montage electrode placements for elective admissions, or if deemed necessary. Recording was at a sampling rate of 256 samples per second per channel. Time synchronized digital video recording was done simultaneously with EEG recording. A low light infrared camera was used for low light recording.     _____________________________________________________________  HISTORY    Patient is a 84y old  Male who presents with a chief complaint of Cardiac Arrest (2025 12:03)      PERTINENT MEDICATION:  MEDICATIONS  (STANDING):  aMIOdarone Infusion 0.5 mG/Min (16.7 mL/Hr) IV Continuous <Continuous>  aspirin  chewable 81 milliGRAM(s) Oral daily  atorvastatin 80 milliGRAM(s) Oral at bedtime  chlorhexidine 0.12% Liquid 15 milliLiter(s) Oral Mucosa every 12 hours  chlorhexidine 2% Cloths 1 Application(s) Topical daily  clopidogrel Tablet 75 milliGRAM(s) Oral daily  dexMEDEtomidine Infusion 0.5 MICROgram(s)/kG/Hr (8.6 mL/Hr) IV Continuous <Continuous>  dextrose 50% Injectable 25 Gram(s) IV Push once  fentaNYL   Infusion. 0.5 MICROgram(s)/kG/Hr (3.44 mL/Hr) IV Continuous <Continuous>  glucagon  Injectable 1 milliGRAM(s) IntraMuscular once  heparin  Infusion.  Unit(s)/Hr (12 mL/Hr) IV Continuous <Continuous>  influenza  Vaccine (HIGH DOSE) 0.5 milliLiter(s) IntraMuscular once  insulin lispro (ADMELOG) corrective regimen sliding scale   SubCutaneous every 6 hours  norepinephrine Infusion 0.05 MICROgram(s)/kG/Min (3.23 mL/Hr) IV Continuous <Continuous>  phenylephrine    Infusion 2 MICROgram(s)/kG/Min (25.8 mL/Hr) IV Continuous <Continuous>  piperacillin/tazobactam IVPB.. 3.375 Gram(s) IV Intermittent every 12 hours  vancomycin  IVPB 1000 milliGRAM(s) IV Intermittent once  vasopressin Infusion 0.06 Unit(s)/Min (9 mL/Hr) IV Continuous <Continuous>    _____________________________________________________________  STUDY INTERPRETATION      FINDINGS:      Background:  Continuity: continuous  Symmetry: symmetric  PDR: none  Reactivity: none  Voltage: suppressed  Anterior Posterior Gradient: absent  Other background findings: none  Breach: absent    Background Slowing:  Generalized slowing: none was present.  Focal slowing: none was present.    State Changes:   Absent    Sporadic Epileptiform Discharges:    None    Rhythmic and Periodic Patterns (RPPs):  None     Electrographic and Electroclinical seizures:  None    Other Clinical Events:  None    Activation Procedures:   -Hyperventilation was not performed.    -Photic stimulation was not performed.    Artifacts:  Intermittent myogenic and movement artifacts were noted.    ECG:  The heart rate on single channel ECG was obscured by artifact.    Summary:  Abnormal  EEG in the comatose state.  Severe diffuse attenuation with no definite cerebral waveforms.    Clinical Impression:  Severe degree of diffuse or multifocal cerebral dysfunction.  There were no epileptiform abnormalities recorded.          -------------------------------------------------------------------------------------------------------    Mena Herzog MD  Director, Epilepsy - [facility]    Questions please call [phone]  After hours (5 PM - 8:30 AM) please call the epilepsy answering service at [phone]

## 2025-02-27 NOTE — CHART NOTE - NSCHARTNOTEFT_GEN_A_CORE
0045: Pt noted to be hyperthermic w Tmax 100.4, increasing pressor requirements and worsening metabolic acidosis, transaminitis and BLUE. Pt's family deferred HD. Pt also had a brief generalized abnormal jerky movements lasting around 3 mins and resolved on its own without intervention. No urine output. No stools.   - Tylenol ordered  - Hyperkalemia protocol w lokelma, Insulin, D50  - Sodium bicarb 50mEq x1   - will repeat labs in 4h.   - continued GOC w family in AM  will continue to monitor 0045: Pt noted to be hyperthermic w Tmax 100.4, increasing pressor requirements and worsening metabolic acidosis, transaminitis and BLUE. Pt's family deferred HD. Pt also had a brief generalized abnormal jerky movements lasting around 3 mins and resolved on its own without intervention. No urine output. No stools.   - Tylenol ordered  - Hyperkalemia protocol w lokelma, Insulin, D50  - Sodium bicarb 50mEq x1   - will repeat labs in 4h.   - continued GOC w family in AM  will continue to monitor    0148. Pt noted to have increased pressor requirements, no SpO2 on pulse oximetry. worsening metabolic acidosis. Updated pt's son Mr Flor, discussed at length reg pt's advanced illness, multiorgan dysfunction and grave prognosis and any aggressive measures unlikely to result in meaningful recovery. He is still undecided on comfort measures or withdrawing care, but is amenable to cap pressors as is with no escalation in care. He is also understanding by capping life saving treatment could eventually possibly lead to death.   - continue IV abx  - continue mechanical ventilatory support and sedation  - continue IABP  - cap Levo 1.8mcg/kg/min, vaso 0.06u, Sohan 4mcg/kg/min. MOLST  completed and placed in chart.   - continue GOC conversation in AM  - continue to monitor 0045: Pt noted to be hyperthermic w Tmax 100.4, increasing pressor requirements and worsening metabolic acidosis, transaminitis and BLUE. Pt's family deferred HD. Pt also had a brief generalized abnormal jerky movements lasting around 3 mins and resolved on its own without intervention. No urine output. No stools.   - Tylenol ordered  - Hyperkalemia protocol w lokelma, Insulin, D50  - Sodium bicarb 50mEq x1   - will repeat labs in 4h.   - continued GOC w family in AM  will continue to monitor    0148. Pt noted to have increased pressor requirements, no SpO2 on pulse oximetry. worsening metabolic acidosis. Updated pt's son Mr Flor, discussed at length reg pt's advanced illness, multiorgan dysfunction and grave prognosis and any aggressive measures unlikely to result in meaningful recovery. He is still undecided on comfort measures or withdrawing care, but is amenable to cap pressors as is with no escalation in care. He is also understanding by capping life saving treatment could eventually possibly lead to death.   - continue IV abx  - continue mechanical ventilatory support and sedation  - continue IABP  - cap Levo 1.8mcg/kg/min, vaso 0.06u/min, Sohan 4mcg/kg/min. MOLST completed and placed in chart, witnessed by RN.   - continue GOC conversation in AM  - continue to monitor

## 2025-02-27 NOTE — PROGRESS NOTE ADULT - SUBJECTIVE AND OBJECTIVE BOX
Patient is a 84y old  Male who presents with a chief complaint of Cardiac Arrest (2025 06:40)    HPI:  This is an 85 year old man unknown PMH brought in by EMS post Cardiac Arrest intubated and on levophed.  Son Chacho No at bedside to answer questions.  Lenin No contacted by phone.  Lenin Almanza reports patient called stating he wasnt feeling well at 2:30pm. Son arrived home at 2:45 patient stating he is having chest pain and grabbing chest.  Son called friend who is a nurse he told him to call 911 and did so.  While son was on phone, father collapsed to floor not breathing.  Son waited for EMS to arrive waiting 10 minutes.  EMS-BLS initiated CPR at that time EMS ACLS arrived 2 minutes later and took over.  Initial rhythm was VFib and was shocked. Patient intubated with total CPR time 1 hour during which ROSC achieved 8 times lasting 20-30 seconds before CPR had to be resumed, patient received 4-8 shocks, 30mg of Epinephrine, 100mg of Lidocaine and 2mg of Magnesium as reported by EMS.  In ED, patient found to have ST elevations in Anterior leads and Dr. Greenberg initiated Cath Team.  Patient received Aspirin Load, Plavix 600mg Load and given Heparin bolus.  Patient on Levophed 0.05mg with /70, ST 90-100s.  Patient intubated on 100% O2 and Fentanyl for sedation.  Patient transported to Cath Lab.  Family is not aware of father's medical/surgical history.  Lenin Almanza said he saw a medication bottle labelled Lisinopril mg in his father's room. (2025 18:18)       INTERVAL HPI/OVERNIGHT EVENTS:   No overnight events   Afebrile, hemodynamically stable     Subjective:    ICU Vital Signs Last 24 Hrs  T(C): 37.9 (2025 04:00), Max: 38 (2025 00:00)  T(F): 100.2 (2025 04:00), Max: 100.4 (2025 00:00)  HR: 127 (2025 07:00) (84 - 164)  BP: 148/76 (2025 11:00) (100/55 - 148/76)  BP(mean): 97 (2025 11:00) (70 - 97)  ABP: 100/42 (2025 07:00) (55/31 - 137/50)  ABP(mean): 62 (2025 07:) (39 - 103)  RR: 16 (:) (7 - 20)  SpO2: 93% (2025 06:00) (91% - 100%)    O2 Parameters below as of :  Patient On (Oxygen Delivery Method): ventilator    O2 Concentration (%): 60      I&O's Summary    2025 07:01  -  2025 07:00  --------------------------------------------------------  IN: 4538.5 mL / OUT: 5 mL / NET: 4533.5 mL    2025 07:01  -  2025 08:21  --------------------------------------------------------  IN: 233.8 mL / OUT: 0 mL / NET: 233.8 mL      Mode: AC/ CMV (Assist Control/ Continuous Mandatory Ventilation)  RR (machine): 16  TV (machine): 450  FiO2: 60  PEEP: 5  ITime: 0.67  MAP: 8  PIP: 14      Daily     Daily Weight in k.1 (2025 05:00)    Adult Advanced Hemodynamics Last 24 Hrs  CVP(mm Hg): --  CVP(cm H2O): --  CO: --  CI: --  PA: --  PA(mean): --  PCWP: --  SVR: --  SVRI: --  PVR: --  PVRI: --    EKG/Telemetry Events:    MEDICATIONS  (STANDING):  aMIOdarone Infusion 0.5 mG/Min (16.7 mL/Hr) IV Continuous <Continuous>  aspirin  chewable 81 milliGRAM(s) Oral daily  atorvastatin 80 milliGRAM(s) Oral at bedtime  chlorhexidine 0.12% Liquid 15 milliLiter(s) Oral Mucosa every 12 hours  chlorhexidine 2% Cloths 1 Application(s) Topical daily  clopidogrel Tablet 75 milliGRAM(s) Oral daily  dexMEDEtomidine Infusion 0.5 MICROgram(s)/kG/Hr (8.6 mL/Hr) IV Continuous <Continuous>  dextrose 50% Injectable 25 Gram(s) IV Push once  fentaNYL   Infusion. 0.5 MICROgram(s)/kG/Hr (3.44 mL/Hr) IV Continuous <Continuous>  glucagon  Injectable 1 milliGRAM(s) IntraMuscular once  heparin  Infusion.  Unit(s)/Hr (12 mL/Hr) IV Continuous <Continuous>  influenza  Vaccine (HIGH DOSE) 0.5 milliLiter(s) IntraMuscular once  insulin lispro (ADMELOG) corrective regimen sliding scale   SubCutaneous every 6 hours  norepinephrine Infusion 0.05 MICROgram(s)/kG/Min (3.23 mL/Hr) IV Continuous <Continuous>  phenylephrine    Infusion 2 MICROgram(s)/kG/Min (25.8 mL/Hr) IV Continuous <Continuous>  piperacillin/tazobactam IVPB.. 3.375 Gram(s) IV Intermittent every 12 hours  vancomycin  IVPB 1000 milliGRAM(s) IV Intermittent once  vasopressin Infusion 0.06 Unit(s)/Min (9 mL/Hr) IV Continuous <Continuous>    MEDICATIONS  (PRN):  heparin   Injectable 5500 Unit(s) IV Push every 6 hours PRN For aPTT less than 40  heparin   Injectable 2500 Unit(s) IV Push every 6 hours PRN For aPTT between 40 - 57      PHYSICAL EXAM:  GENERAL:   HEAD:  Atraumatic, Normocephalic  EYES: EOMI, PERRLA, conjunctiva and sclera clear  NECK: Supple, No JVD, Normal thyroid, no enlarged nodes  NERVOUS SYSTEM:  Alert & Awake.   CHEST/LUNG: B/L good air entry; No rales, rhonchi, or wheezing  HEART: S1S2 normal, no S3, Regular rate and rhythm; No murmurs  ABDOMEN: Soft, Nontender, Nondistended; Bowel sounds present  EXTREMITIES:  2+ Peripheral Pulses, No clubbing, cyanosis, or edema  LYMPH: No lymphadenopathy noted  SKIN: No rashes or lesions    LABS:                        10.6   18.29 )-----------( 211      ( 2025 03:50 )             33.8     02-27    136  |  102  |  49[H]  ----------------------------<  156[H]  4.6   |  12[L]  |  5.68[H]    Ca    6.6[L]      2025 03:50  Phos  5.9       Mg     2.20         TPro  5.3[L]  /  Alb  2.5[L]  /  TBili  2.8[H]  /  DBili  x   /  AST  2832[H]  /  ALT  3447[H]  /  AlkPhos  91      LIVER FUNCTIONS - ( 2025 03:50 )  Alb: 2.5 g/dL / Pro: 5.3 g/dL / ALK PHOS: 91 U/L / ALT: 3447 U/L / AST: 2832 U/L / GGT: x           PTT - ( 2025 05:40 )  PTT:86.3 sec  CAPILLARY BLOOD GLUCOSE      POCT Blood Glucose.: 127 mg/dL (2025 05:41)  POCT Blood Glucose.: 147 mg/dL (2025 03:49)  POCT Blood Glucose.: 167 mg/dL (2025 03:01)  POCT Blood Glucose.: 232 mg/dL (2025 01:57)  POCT Blood Glucose.: 128 mg/dL (2025 01:29)  POCT Blood Glucose.: 168 mg/dL (2025 23:00)  POCT Blood Glucose.: 226 mg/dL (2025 17:31)  POCT Blood Glucose.: 243 mg/dL (2025 12:36)    ABG - ( 2025 03:50 )  pH, Arterial: 7.19  pH, Blood: x     /  pCO2: 35    /  pO2: 76    / HCO3: 13    / Base Excess: -13.8 /  SaO2: 95.4                CARDIAC MARKERS ( 2025 04:16 )  x     / x     / x     / x     / 7.5 ng/mL  CARDIAC MARKERS ( 2025 23:17 )  x     / x     / x     / x     / 8.3 ng/mL      Urinalysis Basic - ( 2025 03:50 )    Color: x / Appearance: x / SG: x / pH: x  Gluc: 156 mg/dL / Ketone: x  / Bili: x / Urobili: x   Blood: x / Protein: x / Nitrite: x   Leuk Esterase: x / RBC: x / WBC x   Sq Epi: x / Non Sq Epi: x / Bacteria: x          RADIOLOGY & ADDITIONAL TESTS:  CXR:        Care Discussed with Consultants/Other Providers [ x] YES  [ ] NO             Patient is a 84y old  Male who presents with a chief complaint of Cardiac Arrest (2025 06:40)    HPI:  This is an 85 year old man unknown PMH brought in by EMS post Cardiac Arrest intubated and on levophed.  Son Chacho No at bedside to answer questions.  Lenin No contacted by phone.  Lenin Almanza reports patient called stating he wasnt feeling well at 2:30pm. Son arrived home at 2:45 patient stating he is having chest pain and grabbing chest.  Son called friend who is a nurse he told him to call 911 and did so.  While son was on phone, father collapsed to floor not breathing.  Son waited for EMS to arrive waiting 10 minutes.  EMS-BLS initiated CPR at that time EMS ACLS arrived 2 minutes later and took over.  Initial rhythm was VFib and was shocked. Patient intubated with total CPR time 1 hour during which ROSC achieved 8 times lasting 20-30 seconds before CPR had to be resumed, patient received 4-8 shocks, 30mg of Epinephrine, 100mg of Lidocaine and 2mg of Magnesium as reported by EMS.  In ED, patient found to have ST elevations in Anterior leads and Dr. Greenberg initiated Cath Team.  Patient received Aspirin Load, Plavix 600mg Load and given Heparin bolus.  Patient on Levophed 0.05mg with /70, ST 90-100s.  Patient intubated on 100% O2 and Fentanyl for sedation.  Patient transported to Cath Lab.  Family is not aware of father's medical/surgical history.  Lenin Almanza said he saw a medication bottle labelled Lisinopril mg in his father's room. (2025 18:18)       INTERVAL HPI/OVERNIGHT EVENTS:   Patient was febrile overnight. Pressors were capped. Patient had seizure like activity overnight     Subjective:  After discussion with patient's son, patient is now DNR/DNI.     ICU Vital Signs Last 24 Hrs  T(C): 37.9 (2025 04:00), Max: 38 (2025 00:00)  T(F): 100.2 (2025 04:00), Max: 100.4 (2025 00:00)  HR: 127 (2025 07:00) (84 - 164)  BP: 148/76 (2025 11:00) (100/55 - 148/76)  BP(mean): 97 (2025 11:00) (70 - 97)  ABP: 100/42 (2025 07:) (55/31 - 137/50)  ABP(mean): 62 (2025 07:00) (39 - 103)  RR: 16 (:) (7 - 20)  SpO2: 93% (2025 06:00) (91% - 100%)    O2 Parameters below as of :  Patient On (Oxygen Delivery Method): ventilator    O2 Concentration (%): 60      I&O's Summary    2025 07:01  -  2025 07:00  --------------------------------------------------------  IN: 4538.5 mL / OUT: 5 mL / NET: 4533.5 mL    2025 07:01  -  2025 08:21  --------------------------------------------------------  IN: 233.8 mL / OUT: 0 mL / NET: 233.8 mL      Mode: AC/ CMV (Assist Control/ Continuous Mandatory Ventilation)  RR (machine): 16  TV (machine): 450  FiO2: 60  PEEP: 5  ITime: 0.67  MAP: 8  PIP: 14      Daily     Daily Weight in k.1 (2025 05:00)    Adult Advanced Hemodynamics Last 24 Hrs  CVP(mm Hg): --  CVP(cm H2O): --  CO: --  CI: --  PA: --  PA(mean): --  PCWP: --  SVR: --  SVRI: --  PVR: --  PVRI: --    EKG/Telemetry Events:    MEDICATIONS  (STANDING):  aMIOdarone Infusion 0.5 mG/Min (16.7 mL/Hr) IV Continuous <Continuous>  aspirin  chewable 81 milliGRAM(s) Oral daily  atorvastatin 80 milliGRAM(s) Oral at bedtime  chlorhexidine 0.12% Liquid 15 milliLiter(s) Oral Mucosa every 12 hours  chlorhexidine 2% Cloths 1 Application(s) Topical daily  clopidogrel Tablet 75 milliGRAM(s) Oral daily  dexMEDEtomidine Infusion 0.5 MICROgram(s)/kG/Hr (8.6 mL/Hr) IV Continuous <Continuous>  dextrose 50% Injectable 25 Gram(s) IV Push once  fentaNYL   Infusion. 0.5 MICROgram(s)/kG/Hr (3.44 mL/Hr) IV Continuous <Continuous>  glucagon  Injectable 1 milliGRAM(s) IntraMuscular once  heparin  Infusion.  Unit(s)/Hr (12 mL/Hr) IV Continuous <Continuous>  influenza  Vaccine (HIGH DOSE) 0.5 milliLiter(s) IntraMuscular once  insulin lispro (ADMELOG) corrective regimen sliding scale   SubCutaneous every 6 hours  norepinephrine Infusion 0.05 MICROgram(s)/kG/Min (3.23 mL/Hr) IV Continuous <Continuous>  phenylephrine    Infusion 2 MICROgram(s)/kG/Min (25.8 mL/Hr) IV Continuous <Continuous>  piperacillin/tazobactam IVPB.. 3.375 Gram(s) IV Intermittent every 12 hours  vancomycin  IVPB 1000 milliGRAM(s) IV Intermittent once  vasopressin Infusion 0.06 Unit(s)/Min (9 mL/Hr) IV Continuous <Continuous>    MEDICATIONS  (PRN):  heparin   Injectable 5500 Unit(s) IV Push every 6 hours PRN For aPTT less than 40  heparin   Injectable 2500 Unit(s) IV Push every 6 hours PRN For aPTT between 40 - 57      PHYSICAL EXAM:  GENERAL: Intubated and sedated  NERVOUS SYSTEM:  Intubated and sedated, AOx0  CHEST/LUNG: Intubated and sedated  ABDOMEN: OG tube    LABS:                        10.6   18.29 )-----------( 211      ( 2025 03:50 )             33.8         136  |  102  |  49[H]  ----------------------------<  156[H]  4.6   |  12[L]  |  5.68[H]    Ca    6.6[L]      2025 03:50  Phos  5.9       Mg     2.20         TPro  5.3[L]  /  Alb  2.5[L]  /  TBili  2.8[H]  /  DBili  x   /  AST  2832[H]  /  ALT  3447[H]  /  AlkPhos  91      LIVER FUNCTIONS - ( 2025 03:50 )  Alb: 2.5 g/dL / Pro: 5.3 g/dL / ALK PHOS: 91 U/L / ALT: 3447 U/L / AST: 2832 U/L / GGT: x           PTT - ( 2025 05:40 )  PTT:86.3 sec  CAPILLARY BLOOD GLUCOSE      POCT Blood Glucose.: 127 mg/dL (2025 05:41)  POCT Blood Glucose.: 147 mg/dL (2025 03:49)  POCT Blood Glucose.: 167 mg/dL (2025 03:01)  POCT Blood Glucose.: 232 mg/dL (2025 01:57)  POCT Blood Glucose.: 128 mg/dL (2025 01:29)  POCT Blood Glucose.: 168 mg/dL (2025 23:00)  POCT Blood Glucose.: 226 mg/dL (2025 17:31)  POCT Blood Glucose.: 243 mg/dL (2025 12:36)    ABG - ( 2025 03:50 )  pH, Arterial: 7.19  pH, Blood: x     /  pCO2: 35    /  pO2: 76    / HCO3: 13    / Base Excess: -13.8 /  SaO2: 95.4                CARDIAC MARKERS ( 2025 04:16 )  x     / x     / x     / x     / 7.5 ng/mL  CARDIAC MARKERS ( 2025 23:17 )  x     / x     / x     / x     / 8.3 ng/mL      Urinalysis Basic - ( 2025 03:50 )    Color: x / Appearance: x / SG: x / pH: x  Gluc: 156 mg/dL / Ketone: x  / Bili: x / Urobili: x   Blood: x / Protein: x / Nitrite: x   Leuk Esterase: x / RBC: x / WBC x   Sq Epi: x / Non Sq Epi: x / Bacteria: x          RADIOLOGY & ADDITIONAL TESTS:  CXR:        Care Discussed with Consultants/Other Providers [ x] YES  [ ] NO

## 2025-02-27 NOTE — PROGRESS NOTE ADULT - ASSESSMENT
85M no known PMH brought into [facility] ED post Cardiac Arrest, down 10 minutes before cpr and 1 hour before rosc, intubated and on levophed found to have AWMI brought urgently to Cath Lab with Dr. Greenberg.       PLAN  ======= NEUROLOGY ===========  #Unresponsive post Cardiac Arrest  Intubated and sedated on Ventilator  - Fentanyl for RASS goal -2 to -3  - Propofol gtt for sedation/VT supression-> initial rhythm per EMS was VFib  - CTH: IMPRESSION: No acute intracranial hemorrhage, mass effect, or shift of the midline structures  If diffuse anoxic brain injury remains a persistent clinical concern, recommend a short-term interval follow-up CT examination.  -Neuro rec appreciated:   Myoclonic jerks and other movements common after cardiac arrest - difficult to distinguish between epileptic or nonepileptic  Concern for anoxic encephalopathy indicative of significant brain injury.   neurologic prognosis likely poor based on total downtime at least 20 minutes, myoclonus, and absence of some brainstem reflexes. (current exam could be impacted by sedation)  F/u neuron specific enolase  - EEG       ======= RESPIRATORY ===============  # Intubated post Cardiac Arrest  - AC Rate 450/16/40/5   - Daily CXR       ======= CARDIOVASCULAR ==============    # AWMI  cardiac arrest  - - s/p LHC: severe pLAD stenosis 90%, mLAD 50-60%, LCx okay, anomalous RCA. No PCI  - R fem IABP placed for perfusion;   - RHC: RA 10 mmHg. PA 45/13/26 mmHg. PCW 12 mmHg. Cardiac index 3.03 L/min/m2. SVR 1088 dsc.  - Continue Levophed to maintain MAP > 65mmHg  - Continue Vasopressin 0.06mg/min  - Start phenylephrine gtt  - Tentative PCI pending neurologic recovery as d/w Dr Greenberg  - Aspirin 81mg daily  - Plavix 75mg daily  - D/c Lipitor    -HFrEF   - Left ventricular systolic function is mildly to moderately decreased with an ejection fraction visually estimated at 45 %. Regional wall motion abnormalities present.   - There is hypokinesis of the apical septal, mid anteroseptal, mid inferoseptal and apical walls.  - CVP Q4 - Lasix 40 IVP for CVP >10      ====== RENAL/  ===============    BLUE  likely hemodynamically mediated in setting of cardiac arrest,   - elevated Scr of 3.87  -Trend BUN/Cr.  -Strict I/O   Hold ACE-I in setting of BLUE . Avoid Nephrotoxic Meds/ Agents   -Adjust Medications according to eGFR      ======= GASTROINTESTINAL =========  #OG Tube  # Glucerna TF up titrating to 40ml/hr  - LFTs increasing  - d/c Lipitor    LIPIDS  chol m152  triglycerides 59  HDL 56  LDL 84        ======= ENDOCRINE  ==========  - HgbA1C: 6.1   - TSH 1.5      ======== HEMATOLOGIC/ONC =========  #Leukocytosis  - Likely reactive post cardiac arrest/AWMI      ======== INFECTIOUS DISEASE ==========  #MRSA Swab       =============== ====== PPX ===============================  VTE:  Heparin gtt while w/IABP  Stress ulcer: IV PPI qd      ====================DISPOSITION================================  #Maintain in CCU while critically ill  #FULL CODE as d/w patient's sons at bedside    ===================LINES=====================================  RIJ triple lumen 2/24/  L radial A-line 2/23  PIV 2/23    ====================DISPO=======================================  #Ongoing GOC 85M no known PMH brought into [facility] ED post Cardiac Arrest, down 10 minutes before cpr and 1 hour before rosc, intubated and on levophed found to have AWMI brought urgently to Cath Lab with Dr. Greenberg. Patient now DNR/DNI      PLAN  ======= NEUROLOGY ===========  #Unresponsive post Cardiac Arrest  Intubated and sedated on Ventilator  - Fentanyl for RASS goal -2 to -3  - Propofol gtt for sedation/VT supression-> initial rhythm per EMS was VFib  - CTH: IMPRESSION: No acute intracranial hemorrhage, mass effect, or shift of the midline structures  If diffuse anoxic brain injury remains a persistent clinical concern, recommend a short-term interval follow-up CT examination.  -Neuro rec appreciated:   Myoclonic jerks and other movements common after cardiac arrest - difficult to distinguish between epileptic or nonepileptic  Concern for anoxic encephalopathy indicative of significant brain injury.   neurologic prognosis likely poor based on total downtime at least 20 minutes, myoclonus, and absence of some brainstem reflexes. 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 No PCI  - R fem IABP placed for perfusion;   - RHC: RA 10 mmHg. PA 45/13/26 mmHg. PCW 12 mmHg. Cardiac index 3.03 L/min/m2. SVR 1088 dsc.  - Continue Levophed to maintain MAP > 65mmHg  - Continue Vasopressin 0.06mg/min  - Start phenylephrine gtt  - Tentative PCI pending neurologic recovery as d/w Dr Greenberg  - Aspirin 81mg daily  - Plavix 75mg daily  - D/c Lipitor  - After discussion with sons, patient is now DNR    -HFrEF   - Left ventricular systolic function is mildly to moderately decreased with an ejection fraction visually estimated at 45 %. Regional wall motion abnormalities present.   - There is hypokinesis of the apical septal, mid anteroseptal, mid inferoseptal and apical walls.  - CVP Q4 - Lasix 40 IVP for CVP >10      ====== RENAL/  ===============    BLUE  likely hemodynamically mediated in setting of cardiac arrest,   - elevated Scr of 3.87  -Trend BUN/Cr.  -Strict I/O   Hold ACE-I in setting of BLUE . Avoid Nephrotoxic Meds/ Agents   -Adjust Medications according to eGFR      ======= GASTROINTESTINAL =========  #OG Tube  # Glucerna TF up titrating to 40ml/hr  - LFTs increasing  - d/c Lipitor    LIPIDS  chol m152  triglycerides 59  HDL 56  LDL 84        ======= ENDOCRINE  ==========  - HgbA1C: 6.1   - TSH 1.5      ======== HEMATOLOGIC/ONC =========  #Leukocytosis  - Likely reactive post cardiac arrest/AWMI      ======== INFECTIOUS DISEASE ==========  #MRSA Swab       =============== ====== PPX ===============================  VTE:  Heparin gtt while w/IABP  Stress ulcer: IV PPI qd      ====================DISPOSITION================================  #Maintain in CCU while critically ill  #DNR after discussion with patient's sons    ===================LINES=====================================  RIJ triple lumen 2/24/  L radial A-line 2/23  PIV 2/23    ====================DISPO=======================================  #Patient is now DNR

## 2025-02-27 NOTE — PROVIDER CONTACT NOTE (HYPOGLYCEMIA EVENT) - NS PROVIDER CONTACT BACKGROUND-HYPO
Age: 84y    Gender: Male    POCT Blood Glucose:  37 mg/dL (02-27-25 @ 11:44)  36 mg/dL (02-27-25 @ 11:42)  127 mg/dL (02-27-25 @ 05:41)  147 mg/dL (02-27-25 @ 03:49)  167 mg/dL (02-27-25 @ 03:01)  232 mg/dL (02-27-25 @ 01:57)  128 mg/dL (02-27-25 @ 01:29)  168 mg/dL (02-26-25 @ 23:00)      eMAR:atorvastatin   80 milliGRAM(s) Oral (02-26-25 @ 22:57)    dextrose 50% Injectable   50 milliLiter(s) IV Push (02-27-25 @ 01:30)    insulin lispro (ADMELOG) corrective regimen sliding scale   1 Unit(s) SubCutaneous (02-26-25 @ 23:11)   2 Unit(s) SubCutaneous (02-26-25 @ 17:37)   2 Unit(s) SubCutaneous (02-26-25 @ 12:40)    insulin regular  human recombinant   5 Unit(s) IV Push (02-27-25 @ 01:30)    vasopressin Infusion   9 mL/Hr IV Continuous (02-26-25 @ 19:53)    No treatment per Dr. Talbot, pt. was made DNR/DNI and comfort care.

## 2025-02-27 NOTE — PROGRESS NOTE ADULT - CRITICAL CARE ATTENDING COMMENT
84 year old man with HTN who had witnessed arrest at home. EMS alonzo dand CPR started when they arrived after 10 min. Coded x 1 hour in the field and brought to [facility] emergently. Had severe pLAD disease. RHC showed CI 3/RA 10/PCWP 12/SVR 1000  IABP placed  Overnight- seizure like activity with myoclonic movements  Febrile 102.9  Had AF - given adenosine x 1 and shocked; now on amiodarone  Milrinone dc'd  Seized overnight  Now DNR/DNI     TTE 2/24/25:  1. Left ventricular systolic function is mildly to moderately decreased with an ejection fraction visually estimated at 45 %. Regional wall motion abnormalities present.   2. There is hypokinesis of the apical septal, mid anteroseptal, mid inferoseptal and apical walls.   3. Normal right ventricular cavity size and normal right ventricular systolic function.   4. Left atrium is normal in size.   5. No significant valvular disease.   6. No pericardial effusion seen.   7. Right pleural effusion noted.   8. The inferior vena cava is normal in size measuring 1.16 cm in diameter, (normal <2.1cm) with normal inspiratory collapse (normal >50%) consistent with normal right atrial pressure (~3, range 0-5mmHg).    CT head:   No acute intracranial hemorrhage, mass effect, or shift of   the midline structures.  Imaging findings for which normal pressure or communicating hydrocephalus   can be considered in the correct clinical scenario.  If diffuse anoxic brain injury remains a persistent clinical concern,   recommend a short-term interval follow-up CT examination.    Meds:    Amio gtt  Phenylnephrine  Levophed gtt  Precedex gtt  Fentanyl gtt  Heparin ggt  ASA 81 mg daily  Plavix 5 mg daily  Atorvastatin 80 mg daily    #Neuro- Neuro consult called- input appreciated  Having myoclonic movement /seizures consistent with anoxia  Neuron specific enolase sent  CT head nondiagnostic  EEG consistent with severe diffuse dysfunction  #Pulm- Intubated  #CV- AWMI with out of hospital arrest (initial rhythm VF)  Now with IABP in place  on DAPT/statin  Levophed/Vaso/Sohan- capped   Amio started  Heparin resumed  #Renal- No active issues  #ID- now febrile; cx sent  will cover with zosyn vanco   #DNR/DNI

## 2025-02-27 NOTE — DISCHARGE NOTE FOR THE EXPIRED PATIENT - HOSPITAL COURSE
This is an 85 year old man unknown [facility] brought in by EMS post Cardiac Arrest intubated and on levophed.  Son Chacho No at bedside to answer questions.  Lenin No contacted by phone.  Lenin Almanza reports patient called stating he wasnt feeling well at 2:30pm. Son arrived home at 2:45 patient stating he is having chest pain and grabbing chest.  Son called friend who is a nurse he told him to call 911 and did so.  While son was on phone, father collapsed to floor not breathing.  Son waited for EMS to arrive waiting 10 minutes.  EMS[facility] initiated CPR at that time EMS ACLS arrived 2 minutes later and took over.  Initial rhythm was VFib and was shocked. Patient intubated with total CPR time 1 hour during which ROSC achieved 8 times lasting 20-30 seconds before CPR had to be resumed, patient received 4-8 shocks, 30mg of Epinephrine, 100mg of Lidocaine and 2mg of Magnesium as reported by EMS.  In ED, patient found to have ST elevations in Anterior leads and Dr. Greenberg initiated Cath Team.  Patient received Aspirin Load, Plavix 600mg Load and given Heparin bolus.  Patient on Levophed 0.05mg with /70, ST 90-100s.  Patient intubated on 100% O2 and Fentanyl for sedation.  Patient transported to Cath Lab.  Family is not aware of father's medical/surgical history.  Lenin Almanza said he saw a medication bottle labelled Lisinopril mg in his father's room. (2025 18:18)     Hospital course: Patient was intubated, on IV pressor medication, on an intraaortic balloon pump. He became more hemodynamically unstable despite IV pressors  levophed gtt, vasopressin gtt, phenylephrine gtt. Patient continued to have multiorgan dysfunction and neurological status compromised by his initial cardiac arrest. Ongoing conversations with family regarding goals of care. Palliative care consulted. Patient was made a DNR today. At 12:01pm patient . Family was notified by Dr. Bell.

## 2025-03-02 LAB
CULTURE RESULTS: SIGNIFICANT CHANGE UP
CULTURE RESULTS: SIGNIFICANT CHANGE UP
SPECIMEN SOURCE: SIGNIFICANT CHANGE UP
SPECIMEN SOURCE: SIGNIFICANT CHANGE UP

## 2025-03-05 LAB
NSE FLD-MCNC: 62.1 NG/ML — HIGH (ref 0–17.6)
NSE FLD-MCNC: 659.6 NG/ML — HIGH (ref 0–17.6)